# Patient Record
Sex: MALE | Race: BLACK OR AFRICAN AMERICAN | Employment: FULL TIME | URBAN - METROPOLITAN AREA
[De-identification: names, ages, dates, MRNs, and addresses within clinical notes are randomized per-mention and may not be internally consistent; named-entity substitution may affect disease eponyms.]

---

## 2022-05-05 ENCOUNTER — APPOINTMENT (OUTPATIENT)
Dept: GENERAL RADIOLOGY | Age: 67
DRG: 871 | End: 2022-05-05
Attending: EMERGENCY MEDICINE

## 2022-05-05 ENCOUNTER — HOSPITAL ENCOUNTER (INPATIENT)
Age: 67
LOS: 2 days | Discharge: LEFT BEFORE DISCHARGE | DRG: 871 | End: 2022-05-07
Attending: EMERGENCY MEDICINE | Admitting: FAMILY MEDICINE

## 2022-05-05 ENCOUNTER — APPOINTMENT (OUTPATIENT)
Dept: CT IMAGING | Age: 67
DRG: 871 | End: 2022-05-05
Attending: EMERGENCY MEDICINE

## 2022-05-05 DIAGNOSIS — K57.92 ACUTE DIVERTICULITIS: Primary | ICD-10-CM

## 2022-05-05 DIAGNOSIS — K57.92 DIVERTICULITIS: ICD-10-CM

## 2022-05-05 DIAGNOSIS — J18.9 PNEUMONIA OF BOTH LUNGS DUE TO INFECTIOUS ORGANISM, UNSPECIFIED PART OF LUNG: ICD-10-CM

## 2022-05-05 PROBLEM — A41.9 SEVERE SEPSIS (HCC): Status: ACTIVE | Noted: 2022-05-05

## 2022-05-05 PROBLEM — K63.1 PERFORATED BOWEL (HCC): Status: ACTIVE | Noted: 2022-05-05

## 2022-05-05 PROBLEM — R65.20 SEVERE SEPSIS (HCC): Status: ACTIVE | Noted: 2022-05-05

## 2022-05-05 LAB
ALBUMIN SERPL-MCNC: 3.5 G/DL (ref 3.2–4.6)
ALBUMIN/GLOB SERPL: 0.7 {RATIO} (ref 1.2–3.5)
ALP SERPL-CCNC: 73 U/L (ref 50–136)
ALT SERPL-CCNC: 28 U/L (ref 12–65)
ANION GAP SERPL CALC-SCNC: 6 MMOL/L (ref 7–16)
APPEARANCE UR: ABNORMAL
AST SERPL-CCNC: 26 U/L (ref 15–37)
ATRIAL RATE: 90 BPM
BACTERIA URNS QL MICRO: ABNORMAL /HPF
BASOPHILS # BLD: 0 K/UL (ref 0–0.2)
BASOPHILS NFR BLD: 0 % (ref 0–2)
BILIRUB SERPL-MCNC: 1.3 MG/DL (ref 0.2–1.1)
BILIRUB UR QL: ABNORMAL
BUN SERPL-MCNC: 11 MG/DL (ref 8–23)
CALCIUM SERPL-MCNC: 9.7 MG/DL (ref 8.3–10.4)
CALCULATED P AXIS, ECG09: 56 DEGREES
CALCULATED R AXIS, ECG10: -75 DEGREES
CALCULATED T AXIS, ECG11: 50 DEGREES
CASTS URNS QL MICRO: 0 /LPF
CHLORIDE SERPL-SCNC: 103 MMOL/L (ref 98–107)
CO2 SERPL-SCNC: 26 MMOL/L (ref 21–32)
COLOR UR: YELLOW
COVID-19 RAPID TEST, COVR: NOT DETECTED
CREAT SERPL-MCNC: 1.2 MG/DL (ref 0.8–1.5)
CRYSTALS URNS QL MICRO: 0 /LPF
DIAGNOSIS, 93000: NORMAL
DIFFERENTIAL METHOD BLD: ABNORMAL
EOSINOPHIL # BLD: 0 K/UL (ref 0–0.8)
EOSINOPHIL NFR BLD: 0 % (ref 0.5–7.8)
EPI CELLS #/AREA URNS HPF: 0 /HPF
ERYTHROCYTE [DISTWIDTH] IN BLOOD BY AUTOMATED COUNT: 15.3 % (ref 11.9–14.6)
GLOBULIN SER CALC-MCNC: 4.9 G/DL (ref 2.3–3.5)
GLUCOSE SERPL-MCNC: 123 MG/DL (ref 65–100)
GLUCOSE UR STRIP.AUTO-MCNC: NEGATIVE MG/DL
HCT VFR BLD AUTO: 45.6 % (ref 41.1–50.3)
HGB BLD-MCNC: 15.5 G/DL (ref 13.6–17.2)
HGB UR QL STRIP: ABNORMAL
IMM GRANULOCYTES # BLD AUTO: 0.1 K/UL (ref 0–0.5)
IMM GRANULOCYTES NFR BLD AUTO: 1 % (ref 0–5)
KETONES UR QL STRIP.AUTO: NEGATIVE MG/DL
LACTATE SERPL-SCNC: 0.9 MMOL/L (ref 0.4–2)
LEUKOCYTE ESTERASE UR QL STRIP.AUTO: NEGATIVE
LYMPHOCYTES # BLD: 1.4 K/UL (ref 0.5–4.6)
LYMPHOCYTES NFR BLD: 9 % (ref 13–44)
MCH RBC QN AUTO: 29.5 PG (ref 26.1–32.9)
MCHC RBC AUTO-ENTMCNC: 34 G/DL (ref 31.4–35)
MCV RBC AUTO: 86.7 FL (ref 79.6–97.8)
MONOCYTES # BLD: 0.7 K/UL (ref 0.1–1.3)
MONOCYTES NFR BLD: 4 % (ref 4–12)
MUCOUS THREADS URNS QL MICRO: 0 /LPF
NEUTS SEG # BLD: 14.5 K/UL (ref 1.7–8.2)
NEUTS SEG NFR BLD: 86 % (ref 43–78)
NITRITE UR QL STRIP.AUTO: NEGATIVE
NRBC # BLD: 0 K/UL (ref 0–0.2)
OTHER OBSERVATIONS,UCOM: ABNORMAL
P-R INTERVAL, ECG05: 194 MS
PH UR STRIP: 6 [PH] (ref 5–9)
PLATELET # BLD AUTO: 215 K/UL (ref 150–450)
PMV BLD AUTO: 10.7 FL (ref 9.4–12.3)
POTASSIUM SERPL-SCNC: 3.6 MMOL/L (ref 3.5–5.1)
PROCALCITONIN SERPL-MCNC: 0.82 NG/ML (ref 0–0.49)
PROT SERPL-MCNC: 8.4 G/DL (ref 6.3–8.2)
PROT UR STRIP-MCNC: 30 MG/DL
Q-T INTERVAL, ECG07: 378 MS
QRS DURATION, ECG06: 136 MS
QTC CALCULATION (BEZET), ECG08: 462 MS
RBC # BLD AUTO: 5.26 M/UL (ref 4.23–5.6)
RBC #/AREA URNS HPF: ABNORMAL /HPF
SODIUM SERPL-SCNC: 135 MMOL/L (ref 138–145)
SOURCE, COVRS: NORMAL
SP GR UR REFRACTOMETRY: >1.03 (ref 1–1.02)
UROBILINOGEN UR QL STRIP.AUTO: 4 EU/DL (ref 0.2–1)
VENTRICULAR RATE, ECG03: 90 BPM
WBC # BLD AUTO: 16.8 K/UL (ref 4.3–11.1)
WBC URNS QL MICRO: ABNORMAL /HPF

## 2022-05-05 PROCEDURE — 84145 PROCALCITONIN (PCT): CPT

## 2022-05-05 PROCEDURE — 87040 BLOOD CULTURE FOR BACTERIA: CPT

## 2022-05-05 PROCEDURE — 74011000258 HC RX REV CODE- 258: Performed by: FAMILY MEDICINE

## 2022-05-05 PROCEDURE — 96361 HYDRATE IV INFUSION ADD-ON: CPT

## 2022-05-05 PROCEDURE — 36415 COLL VENOUS BLD VENIPUNCTURE: CPT

## 2022-05-05 PROCEDURE — 74011250636 HC RX REV CODE- 250/636: Performed by: EMERGENCY MEDICINE

## 2022-05-05 PROCEDURE — 74011000636 HC RX REV CODE- 636: Performed by: EMERGENCY MEDICINE

## 2022-05-05 PROCEDURE — 65270000029 HC RM PRIVATE

## 2022-05-05 PROCEDURE — 83605 ASSAY OF LACTIC ACID: CPT

## 2022-05-05 PROCEDURE — 71045 X-RAY EXAM CHEST 1 VIEW: CPT

## 2022-05-05 PROCEDURE — 87635 SARS-COV-2 COVID-19 AMP PRB: CPT

## 2022-05-05 PROCEDURE — 74011250636 HC RX REV CODE- 250/636: Performed by: FAMILY MEDICINE

## 2022-05-05 PROCEDURE — 80053 COMPREHEN METABOLIC PANEL: CPT

## 2022-05-05 PROCEDURE — 85025 COMPLETE CBC W/AUTO DIFF WBC: CPT

## 2022-05-05 PROCEDURE — 99285 EMERGENCY DEPT VISIT HI MDM: CPT

## 2022-05-05 PROCEDURE — 74011000250 HC RX REV CODE- 250: Performed by: FAMILY MEDICINE

## 2022-05-05 PROCEDURE — 96375 TX/PRO/DX INJ NEW DRUG ADDON: CPT

## 2022-05-05 PROCEDURE — 96365 THER/PROPH/DIAG IV INF INIT: CPT

## 2022-05-05 PROCEDURE — 74177 CT ABD & PELVIS W/CONTRAST: CPT

## 2022-05-05 PROCEDURE — 74011250637 HC RX REV CODE- 250/637: Performed by: EMERGENCY MEDICINE

## 2022-05-05 PROCEDURE — 99223 1ST HOSP IP/OBS HIGH 75: CPT | Performed by: SURGERY

## 2022-05-05 PROCEDURE — 81015 MICROSCOPIC EXAM OF URINE: CPT

## 2022-05-05 PROCEDURE — 81003 URINALYSIS AUTO W/O SCOPE: CPT

## 2022-05-05 PROCEDURE — 74011000250 HC RX REV CODE- 250: Performed by: EMERGENCY MEDICINE

## 2022-05-05 PROCEDURE — 94762 N-INVAS EAR/PLS OXIMTRY CONT: CPT

## 2022-05-05 PROCEDURE — 87086 URINE CULTURE/COLONY COUNT: CPT

## 2022-05-05 PROCEDURE — 74011000258 HC RX REV CODE- 258: Performed by: EMERGENCY MEDICINE

## 2022-05-05 PROCEDURE — 74011250637 HC RX REV CODE- 250/637: Performed by: FAMILY MEDICINE

## 2022-05-05 PROCEDURE — 93005 ELECTROCARDIOGRAM TRACING: CPT | Performed by: EMERGENCY MEDICINE

## 2022-05-05 RX ORDER — SODIUM CHLORIDE 0.9 % (FLUSH) 0.9 %
5-10 SYRINGE (ML) INJECTION EVERY 8 HOURS
Status: DISCONTINUED | OUTPATIENT
Start: 2022-05-05 | End: 2022-05-07 | Stop reason: HOSPADM

## 2022-05-05 RX ORDER — ACETAMINOPHEN 325 MG/1
650 TABLET ORAL
Status: DISCONTINUED | OUTPATIENT
Start: 2022-05-05 | End: 2022-05-07 | Stop reason: HOSPADM

## 2022-05-05 RX ORDER — SODIUM CHLORIDE 0.9 % (FLUSH) 0.9 %
5-40 SYRINGE (ML) INJECTION AS NEEDED
Status: DISCONTINUED | OUTPATIENT
Start: 2022-05-05 | End: 2022-05-07 | Stop reason: HOSPADM

## 2022-05-05 RX ORDER — VANCOMYCIN/0.9 % SOD CHLORIDE 1.5G/250ML
1500 PLASTIC BAG, INJECTION (ML) INTRAVENOUS EVERY 24 HOURS
Status: DISCONTINUED | OUTPATIENT
Start: 2022-05-06 | End: 2022-05-07 | Stop reason: HOSPADM

## 2022-05-05 RX ORDER — ACETAMINOPHEN 500 MG
1000 TABLET ORAL
Status: COMPLETED | OUTPATIENT
Start: 2022-05-05 | End: 2022-05-05

## 2022-05-05 RX ORDER — SODIUM CHLORIDE 0.9 % (FLUSH) 0.9 %
5-40 SYRINGE (ML) INJECTION EVERY 8 HOURS
Status: DISCONTINUED | OUTPATIENT
Start: 2022-05-05 | End: 2022-05-07 | Stop reason: HOSPADM

## 2022-05-05 RX ORDER — DEXTROSE MONOHYDRATE, SODIUM CHLORIDE, SODIUM LACTATE, POTASSIUM CHLORIDE, CALCIUM CHLORIDE 5; 600; 310; 179; 20 G/100ML; MG/100ML; MG/100ML; MG/100ML; MG/100ML
INJECTION, SOLUTION INTRAVENOUS CONTINUOUS
Status: DISCONTINUED | OUTPATIENT
Start: 2022-05-05 | End: 2022-05-07 | Stop reason: HOSPADM

## 2022-05-05 RX ORDER — SODIUM CHLORIDE 0.9 % (FLUSH) 0.9 %
5-10 SYRINGE (ML) INJECTION AS NEEDED
Status: DISCONTINUED | OUTPATIENT
Start: 2022-05-05 | End: 2022-05-07 | Stop reason: HOSPADM

## 2022-05-05 RX ORDER — ONDANSETRON 2 MG/ML
4 INJECTION INTRAMUSCULAR; INTRAVENOUS
Status: DISCONTINUED | OUTPATIENT
Start: 2022-05-05 | End: 2022-05-07 | Stop reason: HOSPADM

## 2022-05-05 RX ORDER — ONDANSETRON 4 MG/1
4 TABLET, ORALLY DISINTEGRATING ORAL
Status: DISCONTINUED | OUTPATIENT
Start: 2022-05-05 | End: 2022-05-07 | Stop reason: HOSPADM

## 2022-05-05 RX ORDER — MORPHINE SULFATE 2 MG/ML
2 INJECTION, SOLUTION INTRAMUSCULAR; INTRAVENOUS
Status: COMPLETED | OUTPATIENT
Start: 2022-05-05 | End: 2022-05-05

## 2022-05-05 RX ORDER — ACETAMINOPHEN 650 MG/1
650 SUPPOSITORY RECTAL
Status: DISCONTINUED | OUTPATIENT
Start: 2022-05-05 | End: 2022-05-07 | Stop reason: HOSPADM

## 2022-05-05 RX ORDER — VANCOMYCIN 2 GRAM/500 ML IN 0.9 % SODIUM CHLORIDE INTRAVENOUS
2000 ONCE
Status: COMPLETED | OUTPATIENT
Start: 2022-05-05 | End: 2022-05-05

## 2022-05-05 RX ORDER — IPRATROPIUM BROMIDE AND ALBUTEROL SULFATE 2.5; .5 MG/3ML; MG/3ML
3 SOLUTION RESPIRATORY (INHALATION)
Status: DISCONTINUED | OUTPATIENT
Start: 2022-05-05 | End: 2022-05-07 | Stop reason: HOSPADM

## 2022-05-05 RX ORDER — ONDANSETRON 2 MG/ML
4 INJECTION INTRAMUSCULAR; INTRAVENOUS
Status: COMPLETED | OUTPATIENT
Start: 2022-05-05 | End: 2022-05-05

## 2022-05-05 RX ORDER — IBUPROFEN 200 MG
1 TABLET ORAL DAILY
Status: DISCONTINUED | OUTPATIENT
Start: 2022-05-06 | End: 2022-05-07 | Stop reason: HOSPADM

## 2022-05-05 RX ORDER — HYDROMORPHONE HYDROCHLORIDE 1 MG/ML
1 INJECTION, SOLUTION INTRAMUSCULAR; INTRAVENOUS; SUBCUTANEOUS
Status: DISCONTINUED | OUTPATIENT
Start: 2022-05-05 | End: 2022-05-07 | Stop reason: HOSPADM

## 2022-05-05 RX ORDER — SODIUM CHLORIDE 0.9 % (FLUSH) 0.9 %
10 SYRINGE (ML) INJECTION
Status: COMPLETED | OUTPATIENT
Start: 2022-05-05 | End: 2022-05-05

## 2022-05-05 RX ADMIN — PIPERACILLIN AND TAZOBACTAM 4.5 G: 4; .5 INJECTION, POWDER, LYOPHILIZED, FOR SOLUTION INTRAVENOUS at 12:21

## 2022-05-05 RX ADMIN — MORPHINE SULFATE 2 MG: 2 INJECTION, SOLUTION INTRAMUSCULAR; INTRAVENOUS at 12:21

## 2022-05-05 RX ADMIN — SODIUM CHLORIDE 100 ML: 9 INJECTION, SOLUTION INTRAVENOUS at 12:12

## 2022-05-05 RX ADMIN — ACETAMINOPHEN 650 MG: 325 TABLET ORAL at 19:35

## 2022-05-05 RX ADMIN — ACETAMINOPHEN 1000 MG: 500 TABLET ORAL at 12:21

## 2022-05-05 RX ADMIN — SODIUM CHLORIDE, PRESERVATIVE FREE 10 ML: 5 INJECTION INTRAVENOUS at 22:35

## 2022-05-05 RX ADMIN — SODIUM CHLORIDE 1000 ML: 9 INJECTION, SOLUTION INTRAVENOUS at 17:49

## 2022-05-05 RX ADMIN — POTASSIUM CHLORIDE, SODIUM CHLORIDE, CALCIUM CHLORIDE, SODIUM LACTATE, AND DEXTROSE MONOHYDRATE: 1.79; 6; .2; 3.1; 5 INJECTION, SOLUTION INTRAVENOUS at 14:59

## 2022-05-05 RX ADMIN — SODIUM CHLORIDE, POTASSIUM CHLORIDE, SODIUM LACTATE AND CALCIUM CHLORIDE 1000 ML: 600; 310; 30; 20 INJECTION, SOLUTION INTRAVENOUS at 10:53

## 2022-05-05 RX ADMIN — SODIUM CHLORIDE 1000 ML: 9 INJECTION, SOLUTION INTRAVENOUS at 14:59

## 2022-05-05 RX ADMIN — ONDANSETRON 4 MG: 2 INJECTION INTRAMUSCULAR; INTRAVENOUS at 12:21

## 2022-05-05 RX ADMIN — SODIUM CHLORIDE, PRESERVATIVE FREE 10 ML: 5 INJECTION INTRAVENOUS at 17:11

## 2022-05-05 RX ADMIN — VANCOMYCIN HYDROCHLORIDE 2000 MG: 10 INJECTION, POWDER, LYOPHILIZED, FOR SOLUTION INTRAVENOUS at 14:59

## 2022-05-05 RX ADMIN — SODIUM CHLORIDE, PRESERVATIVE FREE 10 ML: 5 INJECTION INTRAVENOUS at 22:36

## 2022-05-05 RX ADMIN — PIPERACILLIN AND TAZOBACTAM 3.38 G: 3; .375 INJECTION, POWDER, LYOPHILIZED, FOR SOLUTION INTRAVENOUS at 21:37

## 2022-05-05 RX ADMIN — Medication 10 ML: at 12:13

## 2022-05-05 RX ADMIN — IOPAMIDOL 100 ML: 755 INJECTION, SOLUTION INTRAVENOUS at 12:13

## 2022-05-05 NOTE — CONSULTS
H&P/Consult Note/Progress Note/Office Note:   Perez Dewitt  MRN: [id number]  :1955  Age:66 y.o.    HPI: Perez Dewitt is a 61 y.o. male who presented to the emergency department today with left lower quadrant pain. The patient states that about 2 days ago he began to develop some pain in his left lower quadrant. The pain continued to worsen and progressed over the last few days. He denies any associated nausea or emesis. He states that he is having regular bowel movements. He last had a bowel movement this morning, he states it was regular and soft. He denies any hematochezia. He states that he has never had diverticulitis in the past.    Upon arrival to the emergency department patient had a CT scan performed which revealed findings of acute diverticulitis with no evidence of perforation or abscess. There was some small bowel wall thickening adjacent to diverticulitis likely reactive, there is a possible fistula formed between the small bowel and sigmoid colon. He also had an elevated white blood cell count at 16.8 and a fever of 101.1. The patient has never had a colonoscopy in the past.  He has no significant past surgical history. He has no significant past medical history. The patient is a current smoker and smokes 1 pack/day. No past medical history on file. No past surgical history on file. Current Facility-Administered Medications   Medication Dose Route Frequency    sodium chloride (NS) flush 5-10 mL  5-10 mL IntraVENous Q8H    sodium chloride (NS) flush 5-10 mL  5-10 mL IntraVENous PRN     No current outpatient medications on file. Patient has no known allergies. Social History     Socioeconomic History    Marital status: SINGLE     Social History     Tobacco Use   Smoking Status Not on file   Smokeless Tobacco Not on file     No family history on file. ROS: The patient has no difficulty with chest pain or shortness of breath. No fever or chills.   Comprehensive review of systems was otherwise unremarkable except as noted above. Physical Exam:   Visit Vitals  BP (!) 153/97   Pulse 87   Temp (!) 101.1 °F (38.4 °C)   Resp 27   Ht 5' 6\" (1.676 m)   Wt 200 lb (90.7 kg)   SpO2 94%   BMI 32.28 kg/m²     Vitals:    05/05/22 0935 05/05/22 1051   BP: (!) 144/94 (!) 153/97   Pulse: 94 87   Resp: 22 27   Temp: (!) 101.1 °F (38.4 °C)    SpO2: 95% 94%   Weight: 200 lb (90.7 kg)    Height: 5' 6\" (1.676 m)      No intake/output data recorded. No intake/output data recorded. Constitutional: Alert, oriented, cooperative patient in no acute distress; appears stated age    Eyes:Sclera are clear. EOMs intact  ENMT: no external lesions gross hearing normal; no obvious neck masses, no ear or lip lesions, nares normal  CV: RRR. Normal perfusion  Resp: No JVD. Breathing is  non-labored; no audible wheezing. GI: soft, obese, mildly distended, tender to palpation in the left lower quadrant. No rebound tenderness or guarding. No signs of peritonitis     Musculoskeletal: unremarkable with normal function. No embolic signs or cyanosis. Neuro:  Oriented; moves all 4; no focal deficits  Psychiatric: normal affect and mood, no memory impairment    Recent vitals (if inpt):  Patient Vitals for the past 24 hrs:   BP Temp Pulse Resp SpO2 Height Weight   05/05/22 1051 (!) 153/97  87 27 94 %     05/05/22 0935 (!) 144/94 (!) 101.1 °F (38.4 °C) 94 22 95 % 5' 6\" (1.676 m) 200 lb (90.7 kg)       Amount and/or Complexity of Data Reviewed and Analyzed:  I reviewed and analyzed all of the unique labs and radiologic studies that are shown below as well as any that are in the HPI, and any that are in the expanded problem list below  *Each unique test, order, or document contributes to the combination of 2 or combination of 3 in Category 1 below. For this visit I also reviewed old records and prior notes.       Recent Labs     05/05/22  0952   WBC 16.8*   HGB 15.5      *   K 3.6    CO2 26   BUN 11   CREA 1.20   *   TBILI 1.3*   ALT 28   AP 73     Review of most recent CBC  Lab Results   Component Value Date/Time    WBC 16.8 (H) 05/05/2022 09:52 AM    HGB 15.5 05/05/2022 09:52 AM    HCT 45.6 05/05/2022 09:52 AM    PLATELET 104 18/29/8937 09:52 AM    MCV 86.7 05/05/2022 09:52 AM       Review of most recent BMP  Lab Results   Component Value Date/Time    Sodium 135 (L) 05/05/2022 09:52 AM    Potassium 3.6 05/05/2022 09:52 AM    Chloride 103 05/05/2022 09:52 AM    CO2 26 05/05/2022 09:52 AM    Anion gap 6 (L) 05/05/2022 09:52 AM    Glucose 123 (H) 05/05/2022 09:52 AM    BUN 11 05/05/2022 09:52 AM    Creatinine 1.20 05/05/2022 09:52 AM    GFR est AA >60 05/05/2022 09:52 AM    GFR est non-AA >60 05/05/2022 09:52 AM    Calcium 9.7 05/05/2022 09:52 AM       Review of most recent LFTs (and lipase if done)  Lab Results   Component Value Date/Time    ALT (SGPT) 28 05/05/2022 09:52 AM    AST (SGOT) 26 05/05/2022 09:52 AM    Alk. phosphatase 73 05/05/2022 09:52 AM    Bilirubin, total 1.3 (H) 05/05/2022 09:52 AM     No results found for: LPSE    No results found for: INR, APTT, CBIL, LCAD, NH4, TROPT, TROIQ, INREXT    Review of most recent HgbA1c  No results found for: HBA1C, WJC3HNZC, RFH9RKJM    Nutritional assessment screen for wound healing issues:  Lab Results   Component Value Date/Time    Protein, total 8.4 (H) 05/05/2022 09:52 AM    Albumin 3.5 05/05/2022 09:52 AM       @lastcovr@  XR Results (most recent):  Results from Hospital Encounter encounter on 05/05/22    XR CHEST PORT    Narrative  EXAMINATION: CHEST RADIOGRAPH 5/5/2022 10:01 AM    ACCESSION NUMBER: [id number]    INDICATION: meets SIRS criteria    COMPARISON: None available    TECHNIQUE: A single view of the chest was obtained. FINDINGS:    Support Lines and Tubes: None    Cardiac Silhouette: Within normal limits in size. Lungs: Patchy bibasilar pulmonary opacities. Pleura: No pleural effusion. No pneumothorax.     Osseous Structures: Unremarkable. Upper Abdomen: Unremarkable. Impression  1. Patchy bilateral pulmonary opacities, likely bilateral pneumonia. Correlation  for COVID 19 recommended. 2. Follow-up to resolution is recommended. CT Results (most recent):  Results from Hospital Encounter encounter on 05/05/22    CT ABD PELV W CONT    Narrative  EXAMINATION: CT  ABDOMEN / PELVIS   5/5/2022 12:16 PM    ACCESSION NUMBER:  [id number]    INDICATION:  Left lower quadrant abdominal pain, concern for diverticulitis. COMPARISON: Chest x-ray 5/5/2022    TECHNIQUE: Contiguous axial computed tomographic images were obtained from the  domes of the diaphragm to the symphysis pubis after the administration of  intravenous contrast. Coronal reconstructions were also performed. Radiation dose reduction techniques were used for this study:  Our CT scanners  use one or all of the following: Automated exposure control, adjustment of the  mA and/or kVp according to patient's size, iterative reconstruction. FINDINGS:    LIMITED THORAX:The included portions of the heart and pericardium are  unremarkable. Patchy subpleural opacities of the lung bases. PERITONEUM/MESENTERY: No pneumoperitoneum. No lymphadenopathy. GI TRACT: There is diverticulosis, wall thickening, and surrounding inflammatory  stranding at the junction of the sigmoid colon and descending colon. There is no  adjacent well-formed fluid collection. There is a possible forming fistula with  the adjacent small bowel (axial images 66 and 67). The appendix is normal. The terminal ileum is unremarkable. There is multifocal  left lower quadrant small bowel wall thickening adjacent to the above-described  diverticulitis. The bowel loops are mildly dilated, without evidence of  obstruction. Small hiatal hernia. Mucosal hyperenhancement of the duodenum and proximal  jejunum.     SPLEEN: Normal    ADRENALS: Normal    PANCREAS: Normal    LIVER: Normal    GALLBLADDER: Normal    KIDNEYS: Normal    BLADDER/: Unremarkable urinary bladder. The prostate gland and seminal  vesicles are unremarkable. VESSELS: The main portal vein and major tributary branches are patent. Mild  scattered atherosclerosis. BONES/SOFT TISSUES: Pubic symphysis degenerative change. Lumbar spine  spondylosis without suspicious lytic or blastic bony lesions. Impression  1. Acute diverticulitis at the junction of the descending colon and sigmoid  colon. There is no evidence of large volume colonic perforation or well-formed  pericolonic abscess. 2.  Small bowel wall thickening in the pelvis adjacent to the diverticulitis is  most likely reactive. There is mild bowel dilation, without associated  obstruction. There may be a forming fistula between the small bowel and sigmoid  colon, best visualized on axial images 66 and 67.    3.  Mucosal hyperenhancement throughout the duodenum and proximal jejunum. This  is separate from the diverticulitis and may represent a mild enteritis. 4.  Partially visualized patchy opacities at the lung bases. As discussed on the  prior chest x-ray, this may be a pneumonia. 5.  Chronic findings otherwise as detailed above. ASSESSMENT/PLAN:    Active Problems:    * No active hospital problems. *       The patient has sigmoid diverticulitis with likely microperforation, no evidence of any free air or abscess. No need for any surgical intervention at this time. He does has a phlegmon around sigmoid colon with thickened surrounding small bowel loops,   Recommend patient be admitted and started on IV antibiotics. It was discussed with the patient that if his clinical status worsens there is the chance that he may need an emergent laparotomy with resection and diverting colostomy. But hope to avoid emergency surgery, he might need elective colectomy in future. The patient verbalized understanding.  Patient to be admitted to hospitalist        Number and Complexity of Problems addressed and   Risks of complications and/or morbidity of management            Level of MDM (2/3 elements below)  Number and Complexity of Problems Addressed Amount and/or Complexity of Data to be Reviewed and Analyzed  *Each unique test, order, or document contributes to the combination of 2 or combination of 3 in Category 1 below.  Risk of Complications and/or Morbidity or Mortality of pt Management     84571  42173 SF Minimal  1self-limited or minor problem Minimal or none Minimal risk of morbidity from additional diagnostic testing or Rx   43523  41208 Low Low  2or more self-limited or minor problems;    or  1stable chronic illness;    or  2TETVM, uncomplicated illness or injury   Limited  (Must meet the requirements of at least 1 of the 2 categories)  Category 1: Tests and documents   Any combination of 2 from the following:  Review of prior external note(s) from each unique source*;  review of the result(s) of each unique test*;   ordering of each unique test*    or   Category 2: Assessment requiring an independent historian(s)  (For the categories of independent interpretation of tests and discussion of management or test interpretation, see moderate or high) Low risk of morbidity from additional diagnostic testing or treatment     01983  29954 Mod Moderate  1or more chronic illnesses with exacerbation, progression, or side effects of treatment;    or  2or more stable chronic illnesses;    or  1undiagnosed new problem with uncertain prognosis;    or  1acute illness with systemic symptoms;    or  1TPAMB complicated injury   Moderate  (Must meet the requirements of at least 1 out of 3 categories)  Category 1: Tests, documents, or independent historian(s)  Any combination of 3 from the following:   Review of prior external note(s) from each unique source*;  Review of the result(s) of each unique test*;  Ordering of each unique test*;  Assessment requiring an independent historian(s)    or  Category 2: Independent interpretation of tests   Independent interpretation of a test performed by another physician/other qualified health care professional (not separately reported);     or  Category 3: Discussion of management or test interpretation  Discussion of management or test interpretation with external physician/other qualified health care professional/appropriate source (not separately reported)   Moderate risk of morbidity from additional diagnostic testing or treatment  Examples only:  Prescription drug management   Decision regarding minor surgery with identified patient or procedure risk factors  Decision regarding elective major surgery without identified patient or procedure risk factors   Diagnosis or treatment significantly limited by social determinants of health       65129  00849 High High  1or more chronic illnesses with severe exacerbation, progression, or side effects of treatment;    or  1 acute or chronic illness or injury that poses a threat to life or bodily function   Extensive  (Must meet the requirements of at least 2 out of 3 categories)  Category 1: Tests, documents, or independent historian(s)  Any combination of 3 from the following:   Review of prior external note(s) from each unique source*;  Review of the result(s) of each unique test*;   Ordering of each unique test*;   Assessment requiring an independent historian(s)    or   Category 2: Independent interpretation of tests   Independent interpretation of a test performed by another physician/other qualified health care professional (not separately reported);     or  Category 3: Discussion of management or test interpretation  Discussion of management or test interpretation with external physician/other qualified health care professional/appropriate source (not separately reported)   High risk of morbidity from additional diagnostic testing or treatment  Examples only:  Drug therapy requiring intensive monitoring for toxicity  Decision regarding elective major surgery with identified patient or procedure risk factors  Decision regarding emergency major surgery  Decision regarding hospitalization  Decision not to resuscitate or to de-escalate care because of poor prognosis             I have personally performed a face-to-face diagnostic evaluation and management  service on this patient. I have independently seen the patient. I have independently obtained the above history from the patient/family. I have independently examined the patient with above findings. I have independently reviewed data/labs for this patient and developed the above plan of care (MDM).   Signed: Joseph Chaves MD, FACS

## 2022-05-05 NOTE — PROGRESS NOTES
Patient asleep in bed. Awakes to voice. Respirations present. On room air. No signs of distress. S1 and S2 noted. Radial and pedal pulses palpable bilaterally. Bowel sounds active. Patient states last BM was today, 5/5/2022. Continuous fluids administering as ordered. Bed low and locked. Call light within reach. Preparing to give bedside report to oncoming RN.

## 2022-05-05 NOTE — PROGRESS NOTES
VANCO DAILY FOLLOW UP NOTE  [address] [facility] Pharmacokinetic Monitoring Service - Vancomycin    Consulting Provider: Dr. Gael Baker   Indication: sepsis, intra-abdominal infection, CAP  Target Concentration: Goal AUC/VINI 400-600 mg*hr/L  Day of Therapy: 1  Additional Antimicrobials: pip/tazo    Pertinent Laboratory Values: Wt Readings from Last 1 Encounters:   05/05/22 90.7 kg (200 lb)     Temp Readings from Last 1 Encounters:   05/05/22 (!) 101.1 °F (38.4 °C)     No components found for: PROCAL  Recent Labs     05/05/22  0952   BUN 11   CREA 1.20   WBC 16.8*   PCT 0.82*   LAC 0.9     Estimated Creatinine Clearance: 65.6 mL/min (based on SCr of 1.2 mg/dL). No results found for: Katherin Angeles    MRSA Nasal Swab: N/A. Non-respiratory infection.     Assessment:  Date/Time Dose Concentration AUC         Note: Serum concentrations collected for AUC dosing may appear elevated if collected in close proximity to the dose administered, this is not necessarily an indication of toxicity    Plan:  Dosing recommendations based on Bayesian software  Start vancomycin 2000 mg x 1 followed by 1500 mg q24h  Anticipated AUC of 454 and trough concentration of 12.9 at steady state  Renal labs as indicated   Vancomycin concentrations will be ordered as clinically appropriate   Pharmacy will continue to monitor patient and adjust therapy as indicated    Thank you for the consult,  MIGUEL Stockton

## 2022-05-05 NOTE — PROGRESS NOTES
TRANSFER - IN REPORT:    Verbal report received from Cece(name) on Paris Kinsey  being received from ER(unit) for routine progression of care      Report consisted of patients Situation, Background, Assessment and   Recommendations(SBAR). Information from the following report(s) SBAR was reviewed with the receiving nurse. Opportunity for questions and clarification was provided.

## 2022-05-05 NOTE — ACP (ADVANCE CARE PLANNING)
[facility] Hospitalist [facility]  At the heart of better care     Advance Care Planning   Admit Date:  2022 10:39 AM   Name:  Amador Aceves   Age:  61 y.o. Sex:  male  :  1955   MRN:  [id number]   Room:  [place] [address]    Amador Aceves is able to make his own decisions: Yes    Patient / surrogate decision-maker directed:  Code Status: FULL CODE -full aggressive medical and surgical interventions, including intubations, resuscitations, pressors, artificial tube feeding    Patient or surrogate consented to discussion of the current conditions, workup, management plans, prognosis, and understand the risk for further deterioration. Time spent: 17 minutes in direct discussion (face to face and/or over phone).     Signed:  Alan Gaines DO

## 2022-05-05 NOTE — ED NOTES
TRANSFER - OUT REPORT:    Verbal report given to Ana Laura RN(name) on Trey Colon  being transferred to [facility](unit) for routine progression of care       Report consisted of patients Situation, Background, Assessment and   Recommendations(SBAR). Information from the following report(s) SBAR was reviewed with the receiving nurse. Lines:   Peripheral IV 05/05/22 Left Antecubital (Active)   Site Assessment Clean, dry, & intact 05/05/22 0953   Phlebitis Assessment 0 05/05/22 0953   Infiltration Assessment 0 05/05/22 0953   Dressing Status Clean, dry, & intact 05/05/22 0953   Dressing Type Transparent;Tape 05/05/22 0953   Hub Color/Line Status Pink 05/05/22 0953   Action Taken Blood drawn 05/05/22 0090        Opportunity for questions and clarification was provided.       Patient transported with:   [facility]

## 2022-05-05 NOTE — ED PROVIDER NOTES
29-year-old gentleman denies to me any chronic medical problems or surgery. Denies take any medications states he started 48 hours ago with some lower abdominal pain, worse on the left. Denies to me any fever chills. No nausea vomiting diarrhea constipation or bleeding. He has had an occasional cough. Slight shortness of breath. No sputum no wheezing. Denies really any chest pain. No abdominal pain like this previously. The history is provided by the patient. Abdominal Pain   This is a new problem. The current episode started 2 days ago. The problem occurs constantly. The problem has been gradually worsening. The pain is located in the LLQ. The quality of the pain is aching and dull. The pain is moderate. Pertinent negatives include no fever, no diarrhea, no hematochezia, no melena, no nausea, no vomiting, no constipation, no dysuria, no frequency, no hematuria, no chest pain and no back pain. Nothing worsens the pain. The pain is relieved by nothing. The patient's surgical history non-contributory. No past medical history on file. No past surgical history on file. No family history on file.     Social History     Socioeconomic History    Marital status: SINGLE     Spouse name: Not on file    Number of children: Not on file    Years of education: Not on file    Highest education level: Not on file   Occupational History    Not on file   Tobacco Use    Smoking status: Not on file    Smokeless tobacco: Not on file   Substance and Sexual Activity    Alcohol use: Not on file    Drug use: Not on file    Sexual activity: Not on file   Other Topics Concern    Not on file   Social History Narrative    Not on file     Social Determinants of Health     Financial Resource Strain:     Difficulty of Paying Living Expenses: Not on file   Food Insecurity:     Worried About Running Out of Food in the Last Year: Not on file    Fabian of Food in the Last Year: Not on file   Transportation Needs:     Lack of Transportation (Medical): Not on file    Lack of Transportation (Non-Medical): Not on file   Physical Activity:     Days of Exercise per Week: Not on file    Minutes of Exercise per Session: Not on file   Stress:     Feeling of Stress : Not on file   Social Connections:     Frequency of Communication with Friends and Family: Not on file    Frequency of Social Gatherings with Friends and Family: Not on file    Attends Sabianism Services: Not on file    Active Member of [address] [place] [address] or Organizations: Not on file    Attends Club or Organization Meetings: Not on file    Marital Status: Not on file   Intimate Partner Violence:     Fear of Current or Ex-Partner: Not on file    Emotionally Abused: Not on file    Physically Abused: Not on file    Sexually Abused: Not on file   Housing Stability:     Unable to Pay for Housing in the Last Year: Not on file    Number of [place] in the Last Year: Not on file    Unstable Housing in the Last Year: Not on file         ALLERGIES: Patient has no known allergies. Review of Systems   Constitutional: Negative for chills and fever. Respiratory: Positive for cough and shortness of breath. Cardiovascular: Negative for chest pain. Gastrointestinal: Positive for abdominal pain. Negative for blood in stool, constipation, diarrhea, hematochezia, melena, nausea and vomiting. Genitourinary: Negative for dysuria, frequency and hematuria. Musculoskeletal: Negative for back pain. All other systems reviewed and are negative. Vitals:    05/05/22 0935 05/05/22 1051   BP: (!) 144/94 (!) 153/97   Pulse: 94 87   Resp: 22 27   Temp: (!) 101.1 °F (38.4 °C)    SpO2: 95% 94%   Weight: 90.7 kg (200 lb)    Height: 5' 6\" (1.676 m)             Physical Exam  Vitals and nursing note reviewed. Constitutional:       General: He is not in acute distress. Appearance: He is well-developed. HENT:      Head: Normocephalic and atraumatic.       Right Ear: External ear normal.      Left Ear: External ear normal.      Mouth/Throat:      Pharynx: No oropharyngeal exudate. Eyes:      Conjunctiva/sclera: Conjunctivae normal.      Pupils: Pupils are equal, round, and reactive to light. Cardiovascular:      Rate and Rhythm: Normal rate and regular rhythm. Heart sounds: No murmur heard. Pulmonary:      Effort: No respiratory distress. Breath sounds: Normal breath sounds. Abdominal:      General: Bowel sounds are normal.      Palpations: Abdomen is soft. There is no mass. Tenderness: There is abdominal tenderness in the left lower quadrant. There is no guarding or rebound. Hernia: No hernia is present. Comments: Moderate tenderness at least on palpation left lower quadrant   Musculoskeletal:      Cervical back: Normal range of motion and neck supple. Skin:     General: Skin is warm and dry. Neurological:      Mental Status: He is alert and oriented to person, place, and time. Gait: Gait normal.      Comments: Nl speech   Psychiatric:         Speech: Speech normal.          MDM  Number of Diagnoses or Management Options  Diagnosis management comments: URI symptoms and a febrile patient who also has abdominal pain. Screen for COVID, pneumonia. Screen for sepsis. Abdominal pain concerning. Get CT scan to check for diverticulitis or perforated viscus. Likely would be appendicitis cholecystitis or diverticulitis. Check urinary tract infection.        Amount and/or Complexity of Data Reviewed  Clinical lab tests: ordered and reviewed  Tests in the radiology section of CPT®: ordered and reviewed  Tests in the medicine section of CPT®: ordered and reviewed    Risk of Complications, Morbidity, and/or Mortality  Presenting problems: moderate  Diagnostic procedures: low  Management options: low    Patient Progress  Patient progress: stable         Procedures      Results Include:    Recent Results (from the past 24 hour(s))   EKG, 12 LEAD, INITIAL    Collection Time: 05/05/22  9:39 AM   Result Value Ref Range    Ventricular Rate 90 BPM    Atrial Rate 90 BPM    P-R Interval 194 ms    QRS Duration 136 ms    Q-T Interval 378 ms    QTC Calculation (Bezet) 462 ms    Calculated P Axis 56 degrees    Calculated R Axis -75 degrees    Calculated T Axis 50 degrees    Diagnosis       Normal sinus rhythm  Right bundle branch block  Left anterior fascicular block  !!! Bifascicular block !!!  Minimal voltage criteria for LVH, may be normal variant ( R in aVL )  Septal infarct , age undetermined  Abnormal ECG  No previous ECGs available     LACTIC ACID    Collection Time: 05/05/22  9:52 AM   Result Value Ref Range    Lactic acid 0.9 0.4 - 2.0 MMOL/L   CBC WITH AUTOMATED DIFF    Collection Time: 05/05/22  9:52 AM   Result Value Ref Range    WBC 16.8 (H) 4.3 - 11.1 K/uL    RBC 5.26 4.23 - 5.6 M/uL    HGB 15.5 13.6 - 17.2 g/dL    HCT 45.6 41.1 - 50.3 %    MCV 86.7 79.6 - 97.8 FL    MCH 29.5 26.1 - 32.9 PG    MCHC 34.0 31.4 - 35.0 g/dL    RDW 15.3 (H) 11.9 - 14.6 %    PLATELET 935 502 - 119 K/uL    MPV 10.7 9.4 - 12.3 FL    ABSOLUTE NRBC 0.00 0.0 - 0.2 K/uL    DF AUTOMATED      NEUTROPHILS 86 (H) 43 - 78 %    LYMPHOCYTES 9 (L) 13 - 44 %    MONOCYTES 4 4.0 - 12.0 %    EOSINOPHILS 0 (L) 0.5 - 7.8 %    BASOPHILS 0 0.0 - 2.0 %    IMMATURE GRANULOCYTES 1 0.0 - 5.0 %    ABS. NEUTROPHILS 14.5 (H) 1.7 - 8.2 K/UL    ABS. LYMPHOCYTES 1.4 0.5 - 4.6 K/UL    ABS. MONOCYTES 0.7 0.1 - 1.3 K/UL    ABS. EOSINOPHILS 0.0 0.0 - 0.8 K/UL    ABS. BASOPHILS 0.0 0.0 - 0.2 K/UL    ABS. IMM.  GRANS. 0.1 0.0 - 0.5 K/UL   METABOLIC PANEL, COMPREHENSIVE    Collection Time: 05/05/22  9:52 AM   Result Value Ref Range    Sodium 135 (L) 138 - 145 mmol/L    Potassium 3.6 3.5 - 5.1 mmol/L    Chloride 103 98 - 107 mmol/L    CO2 26 21 - 32 mmol/L    Anion gap 6 (L) 7 - 16 mmol/L    Glucose 123 (H) 65 - 100 mg/dL    BUN 11 8 - 23 MG/DL    Creatinine 1.20 0.8 - 1.5 MG/DL    GFR est AA >60 >60 ml/min/1.73m2    GFR est non-AA >60 >60 ml/min/1.73m2    Calcium 9.7 8.3 - 10.4 MG/DL    Bilirubin, total 1.3 (H) 0.2 - 1.1 MG/DL    ALT (SGPT) 28 12 - 65 U/L    AST (SGOT) 26 15 - 37 U/L    Alk. phosphatase 73 50 - 136 U/L    Protein, total 8.4 (H) 6.3 - 8.2 g/dL    Albumin 3.5 3.2 - 4.6 g/dL    Globulin 4.9 (H) 2.3 - 3.5 g/dL    A-G Ratio 0.7 (L) 1.2 - 3.5     PROCALCITONIN    Collection Time: 05/05/22  9:52 AM   Result Value Ref Range    Procalcitonin 0.82 (H) 0.00 - 0.49 ng/mL   COVID-19 RAPID TEST    Collection Time: 05/05/22 11:12 AM   Result Value Ref Range    Specimen source NASAL      COVID-19 rapid test Not detected NOTD     URINALYSIS W/ RFLX MICROSCOPIC    Collection Time: 05/05/22 12:25 PM   Result Value Ref Range    Color YELLOW      Appearance CLOUDY      Specific gravity >1.030 (H) 1.001 - 1.023    pH (UA) 6.0 5.0 - 9.0      Protein 30 (A) NEG mg/dL    Glucose Negative NEG mg/dL    Ketone Negative NEG mg/dL    Bilirubin SMALL (A) NEG      Blood SMALL (A) NEG      Urobilinogen 4.0 (H) 0.2 - 1.0 EU/dL    Nitrites Negative NEG      Leukocyte Esterase Negative NEG     URINE MICROSCOPIC    Collection Time: 05/05/22 12:25 PM   Result Value Ref Range    WBC 3-5 0 /hpf    RBC 0-3 0 /hpf    Epithelial cells 0 0 /hpf    Bacteria 1+ (H) 0 /hpf    Casts 0 0 /lpf    Crystals, urine 0 0 /LPF    Mucus 0 0 /lpf    Other observations RESULTS VERIFIED MANUALLY       CT ABD PELV W CONT    Result Date: 5/5/2022  EXAMINATION: CT  ABDOMEN / PELVIS   5/5/2022 12:16 PM ACCESSION NUMBER:  [id number] INDICATION:  Left lower quadrant abdominal pain, concern for diverticulitis. COMPARISON: Chest x-ray 5/5/2022 TECHNIQUE: Contiguous axial computed tomographic images were obtained from the domes of the diaphragm to the symphysis pubis after the administration of intravenous contrast. Coronal reconstructions were also performed.  Radiation dose reduction techniques were used for this study:  Our CT scanners use one or all of the following: Automated exposure control, adjustment of the mA and/or kVp according to patient's size, iterative reconstruction. FINDINGS: LIMITED THORAX:The included portions of the heart and pericardium are unremarkable. Patchy subpleural opacities of the lung bases. PERITONEUM/MESENTERY: No pneumoperitoneum. No lymphadenopathy. GI TRACT: There is diverticulosis, wall thickening, and surrounding inflammatory stranding at the junction of the sigmoid colon and descending colon. There is no adjacent well-formed fluid collection. There is a possible forming fistula with the adjacent small bowel (axial images 66 and 67). The appendix is normal. The terminal ileum is unremarkable. There is multifocal left lower quadrant small bowel wall thickening adjacent to the above-described diverticulitis. The bowel loops are mildly dilated, without evidence of obstruction. Small hiatal hernia. Mucosal hyperenhancement of the duodenum and proximal jejunum. SPLEEN: Normal ADRENALS: Normal PANCREAS: Normal LIVER: Normal GALLBLADDER: Normal KIDNEYS: Normal BLADDER/: Unremarkable urinary bladder. The prostate gland and seminal vesicles are unremarkable. VESSELS: The main portal vein and major tributary branches are patent. Mild scattered atherosclerosis. BONES/SOFT TISSUES: Pubic symphysis degenerative change. Lumbar spine spondylosis without suspicious lytic or blastic bony lesions. 1.  Acute diverticulitis at the junction of the descending colon and sigmoid colon. There is no evidence of large volume colonic perforation or well-formed pericolonic abscess. 2.  Small bowel wall thickening in the pelvis adjacent to the diverticulitis is most likely reactive. There is mild bowel dilation, without associated obstruction. There may be a forming fistula between the small bowel and sigmoid colon, best visualized on axial images 66 and 67. 3.  Mucosal hyperenhancement throughout the duodenum and proximal jejunum.  This is separate from the diverticulitis and may represent a mild enteritis. 4.  Partially visualized patchy opacities at the lung bases. As discussed on the prior chest x-ray, this may be a pneumonia. 5.  Chronic findings otherwise as detailed above. XR CHEST PORT    Result Date: 5/5/2022  EXAMINATION: CHEST RADIOGRAPH 5/5/2022 10:01 AM ACCESSION NUMBER: [id number] INDICATION: meets SIRS criteria COMPARISON: None available TECHNIQUE: A single view of the chest was obtained. FINDINGS: Support Lines and Tubes: None Cardiac Silhouette: Within normal limits in size. Lungs: Patchy bibasilar pulmonary opacities. Pleura: No pleural effusion. No pneumothorax. Osseous Structures: Unremarkable. Upper Abdomen: Unremarkable. 1. Patchy bilateral pulmonary opacities, likely bilateral pneumonia. Correlation for COVID 19 recommended. 2. Follow-up to resolution is recommended. Discussed above findings with surgery and possibly gastroenterology. Surgery has seen. Suggested admission with IV antibiotics and they have consulted. Notified hospitalist regarding admission.

## 2022-05-05 NOTE — ED TRIAGE NOTES
Pt ambulatory unassisted to triage with mask in place. Pt complains of abdominal pain and cough x2 days.  Pt denies n/v.

## 2022-05-05 NOTE — H&P
Hospitalist Admission History and Physical     NAME:  Eleanor Dela Cruz   Age:  61 y.o.  :   1955   MRN:   [id number]  PCP: None  Consulting MD:  Treatment Team: Attending Provider: Arden DO; Primary Nurse: Peace Ramírez RN    Chief Complaint   Patient presents with    Abdominal Pain         HPI:   Patient is a 61 y.o. male who presented to the ED for cc LLQ pain for the past two days. No noted trauma. Nothing seems to make it better or worse. Hx of tobacco abuse. Denies SOB    Family history reviewed and negative except as noted above. Vitals- Temp 101. 1. RR 27    Labs- WBC 16.8, total bili 1.3, procal 0.82. Rapid COVID negative.      Chest x ray with patch bilateral opacities    CT abdomen pelvis -  Acute diverticulitis at the junction of the descending colon and sigmoid colon  Social History     Social History Narrative    Not on file        Social History     Tobacco Use    Smoking status: Not on file    Smokeless tobacco: Not on file   Substance Use Topics    Alcohol use: Not on file        Social History     Substance and Sexual Activity   Drug Use Not on file         No Known Allergies    Prior to Admission medications    Not on File           Review of Systems    Constitutional: NAD  Eyes:  no change in visual acuity, no photophobia  Ears, nose, mouth, throat, and face: no  Odynphagia, dysphagia, no thrush or exudate, negative for chronic sinus congestion, recurrent headaches  Respiratory: negative for SOB, hemoptysis or cough  Cardiovascular: negative for CP, palpitations, or PND  Gastrointestinal: LLQ pain  Genitourinary: no urgency, frequency, or dysuria, no nocturia  Integument/breast: negative for skin rash or skin lesions  Hematologic/lymphatic: negative for known bleeding disorder  Musculoskeletal:negative for joint pain or joint tenderness  Neurological: negative for lightheadedness, syncope or presyncopal events, no seizure or CVA history  Behavioral/Psych: negative for depression or chronic anxiety,   Endocrine: negative for polydyspia, polyuria or intolerance to heat or cold  Allergic/Immunologic: negative for chronic allergic rhinitis, or known connective tissue disorder      Objective:     Patient Vitals for the past 24 hrs:   Temp Pulse Resp BP SpO2   05/05/22 1051  87 27 (!) 153/97 94 %   05/05/22 0935 (!) 101.1 °F (38.4 °C) 94 22 (!) 144/94 95 %        No intake/output data recorded. No intake/output data recorded. Data Review:   Recent Results (from the past 24 hour(s))   EKG, 12 LEAD, INITIAL    Collection Time: 05/05/22  9:39 AM   Result Value Ref Range    Ventricular Rate 90 BPM    Atrial Rate 90 BPM    P-R Interval 194 ms    QRS Duration 136 ms    Q-T Interval 378 ms    QTC Calculation (Bezet) 462 ms    Calculated P Axis 56 degrees    Calculated R Axis -75 degrees    Calculated T Axis 50 degrees    Diagnosis       Normal sinus rhythm  Right bundle branch block  Left anterior fascicular block  !!! Bifascicular block !!!  Minimal voltage criteria for LVH, may be normal variant ( R in aVL )  Septal infarct , age undetermined  Abnormal ECG  No previous ECGs available  Confirmed by [facility]  MD (), Sabrina HAINES ([id number]) on 5/5/2022 1:20:58 PM     LACTIC ACID    Collection Time: 05/05/22  9:52 AM   Result Value Ref Range    Lactic acid 0.9 0.4 - 2.0 MMOL/L   CBC WITH AUTOMATED DIFF    Collection Time: 05/05/22  9:52 AM   Result Value Ref Range    WBC 16.8 (H) 4.3 - 11.1 K/uL    RBC 5.26 4.23 - 5.6 M/uL    HGB 15.5 13.6 - 17.2 g/dL    HCT 45.6 41.1 - 50.3 %    MCV 86.7 79.6 - 97.8 FL    MCH 29.5 26.1 - 32.9 PG    MCHC 34.0 31.4 - 35.0 g/dL    RDW 15.3 (H) 11.9 - 14.6 %    PLATELET 449 326 - 673 K/uL    MPV 10.7 9.4 - 12.3 FL    ABSOLUTE NRBC 0.00 0.0 - 0.2 K/uL    DF AUTOMATED      NEUTROPHILS 86 (H) 43 - 78 %    LYMPHOCYTES 9 (L) 13 - 44 %    MONOCYTES 4 4.0 - 12.0 %    EOSINOPHILS 0 (L) 0.5 - 7.8 %    BASOPHILS 0 0.0 - 2.0 %    IMMATURE GRANULOCYTES 1 0.0 - 5.0 %    ABS. NEUTROPHILS 14.5 (H) 1.7 - 8.2 K/UL    ABS. LYMPHOCYTES 1.4 0.5 - 4.6 K/UL    ABS. MONOCYTES 0.7 0.1 - 1.3 K/UL    ABS. EOSINOPHILS 0.0 0.0 - 0.8 K/UL    ABS. BASOPHILS 0.0 0.0 - 0.2 K/UL    ABS. IMM. GRANS. 0.1 0.0 - 0.5 K/UL   METABOLIC PANEL, COMPREHENSIVE    Collection Time: 05/05/22  9:52 AM   Result Value Ref Range    Sodium 135 (L) 138 - 145 mmol/L    Potassium 3.6 3.5 - 5.1 mmol/L    Chloride 103 98 - 107 mmol/L    CO2 26 21 - 32 mmol/L    Anion gap 6 (L) 7 - 16 mmol/L    Glucose 123 (H) 65 - 100 mg/dL    BUN 11 8 - 23 MG/DL    Creatinine 1.20 0.8 - 1.5 MG/DL    GFR est AA >60 >60 ml/min/1.73m2    GFR est non-AA >60 >60 ml/min/1.73m2    Calcium 9.7 8.3 - 10.4 MG/DL    Bilirubin, total 1.3 (H) 0.2 - 1.1 MG/DL    ALT (SGPT) 28 12 - 65 U/L    AST (SGOT) 26 15 - 37 U/L    Alk.  phosphatase 73 50 - 136 U/L    Protein, total 8.4 (H) 6.3 - 8.2 g/dL    Albumin 3.5 3.2 - 4.6 g/dL    Globulin 4.9 (H) 2.3 - 3.5 g/dL    A-G Ratio 0.7 (L) 1.2 - 3.5     PROCALCITONIN    Collection Time: 05/05/22  9:52 AM   Result Value Ref Range    Procalcitonin 0.82 (H) 0.00 - 0.49 ng/mL   COVID-19 RAPID TEST    Collection Time: 05/05/22 11:12 AM   Result Value Ref Range    Specimen source NASAL      COVID-19 rapid test Not detected NOTD     URINALYSIS W/ RFLX MICROSCOPIC    Collection Time: 05/05/22 12:25 PM   Result Value Ref Range    Color YELLOW      Appearance CLOUDY      Specific gravity >1.030 (H) 1.001 - 1.023    pH (UA) 6.0 5.0 - 9.0      Protein 30 (A) NEG mg/dL    Glucose Negative NEG mg/dL    Ketone Negative NEG mg/dL    Bilirubin SMALL (A) NEG      Blood SMALL (A) NEG      Urobilinogen 4.0 (H) 0.2 - 1.0 EU/dL    Nitrites Negative NEG      Leukocyte Esterase Negative NEG     URINE MICROSCOPIC    Collection Time: 05/05/22 12:25 PM   Result Value Ref Range    WBC 3-5 0 /hpf    RBC 0-3 0 /hpf    Epithelial cells 0 0 /hpf    Bacteria 1+ (H) 0 /hpf    Casts 0 0 /lpf    Crystals, urine 0 0 /LPF    Mucus 0 0 /lpf    Other observations RESULTS VERIFIED MANUALLY         Physical Exam:     General:  Alert, cooperative, no distress, appears stated age. Eyes:  Conjunctivae/corneas clear. Ears:  Normal TMs and external ear canals both ears. Nose: Nares normal.   Mouth/Throat: Lips, mucosa, and tongue normal. Teeth and gums normal.   Neck:  no JVD. Back:   deferred   Lungs:   Clear to auscultation bilaterally but diminished at lower bases. Heart:  Regular rate and rhythm, S1, S2 normal   Abdomen:   Soft, mild tenderness to LLQ, +BS   Extremities: Extremities normal, atraumatic, no cyanosis or edema. Pulses: 2+ and symmetric all extremities. Skin: Skin color, texture, turgor normal. No rashes or lesions   Lymph nodes: Cervical, supraclavicular, and axillary nodes normal.   Neurologic: CNII-XII intact. Normal strength, sensation and reflexes throughout. Assessment and Plan     Principal Problem:    Severe sepsis ([address] (5/5/2022)    Active Problems:    Diverticulitis (5/5/2022)      CAP (community acquired pneumonia) (5/5/2022)      Perforated bowel ([address] (5/5/2022)    Severe sepsis (met by WBC, HR, RR, and elevated LFTS) secondary to sigmoid diverticulitis with microperforation and phelgmon. - General surgery has seen and no surgical intervention at this time. Zosyn. NPO. Gentle fluids    CAP - Sputum culture. Zosyn and vancomycin. IS. Trend LFTs.  Likely elevated from current severe sepsis    Nicotine patch    DVT prophylaxis - SCDs  Signed By: Charmaine Kennedy DO   May 5, 2022

## 2022-05-06 LAB
ALBUMIN SERPL-MCNC: 2.8 G/DL (ref 3.2–4.6)
ALBUMIN/GLOB SERPL: 0.6 {RATIO} (ref 1.2–3.5)
ALP SERPL-CCNC: 74 U/L (ref 50–136)
ALT SERPL-CCNC: 30 U/L (ref 12–65)
ANION GAP SERPL CALC-SCNC: 6 MMOL/L (ref 7–16)
AST SERPL-CCNC: 33 U/L (ref 15–37)
BACTERIA SPEC CULT: NORMAL
BACTERIA SPEC CULT: NORMAL
BASOPHILS # BLD: 0 K/UL (ref 0–0.2)
BASOPHILS NFR BLD: 0 % (ref 0–2)
BILIRUB SERPL-MCNC: 1.4 MG/DL (ref 0.2–1.1)
BUN SERPL-MCNC: 10 MG/DL (ref 8–23)
CALCIUM SERPL-MCNC: 9.1 MG/DL (ref 8.3–10.4)
CHLORIDE SERPL-SCNC: 107 MMOL/L (ref 98–107)
CO2 SERPL-SCNC: 25 MMOL/L (ref 21–32)
CREAT SERPL-MCNC: 1.2 MG/DL (ref 0.8–1.5)
DIFFERENTIAL METHOD BLD: ABNORMAL
EOSINOPHIL # BLD: 0 K/UL (ref 0–0.8)
EOSINOPHIL NFR BLD: 0 % (ref 0.5–7.8)
ERYTHROCYTE [DISTWIDTH] IN BLOOD BY AUTOMATED COUNT: 15.3 % (ref 11.9–14.6)
GLOBULIN SER CALC-MCNC: 4.4 G/DL (ref 2.3–3.5)
GLUCOSE SERPL-MCNC: 118 MG/DL (ref 65–100)
GRAM STN SPEC: NORMAL
HCT VFR BLD AUTO: 40.5 % (ref 41.1–50.3)
HGB BLD-MCNC: 13.6 G/DL (ref 13.6–17.2)
IMM GRANULOCYTES # BLD AUTO: 0.1 K/UL (ref 0–0.5)
IMM GRANULOCYTES NFR BLD AUTO: 0 % (ref 0–5)
LYMPHOCYTES # BLD: 1.5 K/UL (ref 0.5–4.6)
LYMPHOCYTES NFR BLD: 10 % (ref 13–44)
MCH RBC QN AUTO: 29.7 PG (ref 26.1–32.9)
MCHC RBC AUTO-ENTMCNC: 33.6 G/DL (ref 31.4–35)
MCV RBC AUTO: 88.4 FL (ref 79.6–97.8)
MONOCYTES # BLD: 0.6 K/UL (ref 0.1–1.3)
MONOCYTES NFR BLD: 4 % (ref 4–12)
NEUTS SEG # BLD: 12.2 K/UL (ref 1.7–8.2)
NEUTS SEG NFR BLD: 85 % (ref 43–78)
NRBC # BLD: 0 K/UL (ref 0–0.2)
PLATELET # BLD AUTO: 186 K/UL (ref 150–450)
PMV BLD AUTO: 11.1 FL (ref 9.4–12.3)
POTASSIUM SERPL-SCNC: 3.8 MMOL/L (ref 3.5–5.1)
PROT SERPL-MCNC: 7.2 G/DL (ref 6.3–8.2)
RBC # BLD AUTO: 4.58 M/UL (ref 4.23–5.6)
SERVICE CMNT-IMP: NORMAL
SODIUM SERPL-SCNC: 138 MMOL/L (ref 136–145)
WBC # BLD AUTO: 14.3 K/UL (ref 4.3–11.1)

## 2022-05-06 PROCEDURE — 36415 COLL VENOUS BLD VENIPUNCTURE: CPT

## 2022-05-06 PROCEDURE — 74011250637 HC RX REV CODE- 250/637: Performed by: FAMILY MEDICINE

## 2022-05-06 PROCEDURE — 94760 N-INVAS EAR/PLS OXIMETRY 1: CPT

## 2022-05-06 PROCEDURE — 74011250636 HC RX REV CODE- 250/636: Performed by: FAMILY MEDICINE

## 2022-05-06 PROCEDURE — 99232 SBSQ HOSP IP/OBS MODERATE 35: CPT | Performed by: NURSE PRACTITIONER

## 2022-05-06 PROCEDURE — 85025 COMPLETE CBC W/AUTO DIFF WBC: CPT

## 2022-05-06 PROCEDURE — 74011000258 HC RX REV CODE- 258: Performed by: FAMILY MEDICINE

## 2022-05-06 PROCEDURE — 80053 COMPREHEN METABOLIC PANEL: CPT

## 2022-05-06 PROCEDURE — 74011000250 HC RX REV CODE- 250: Performed by: EMERGENCY MEDICINE

## 2022-05-06 PROCEDURE — 74011250637 HC RX REV CODE- 250/637: Performed by: INTERNAL MEDICINE

## 2022-05-06 PROCEDURE — 65270000029 HC RM PRIVATE

## 2022-05-06 PROCEDURE — 74011000250 HC RX REV CODE- 250: Performed by: FAMILY MEDICINE

## 2022-05-06 RX ORDER — HYDRALAZINE HYDROCHLORIDE 25 MG/1
25 TABLET, FILM COATED ORAL 3 TIMES DAILY
Status: DISCONTINUED | OUTPATIENT
Start: 2022-05-06 | End: 2022-05-07 | Stop reason: HOSPADM

## 2022-05-06 RX ORDER — HYDRALAZINE HYDROCHLORIDE 20 MG/ML
10 INJECTION INTRAMUSCULAR; INTRAVENOUS
Status: DISCONTINUED | OUTPATIENT
Start: 2022-05-06 | End: 2022-05-07 | Stop reason: HOSPADM

## 2022-05-06 RX ADMIN — ACETAMINOPHEN 650 MG: 325 TABLET ORAL at 02:55

## 2022-05-06 RX ADMIN — VANCOMYCIN HYDROCHLORIDE 1500 MG: 10 INJECTION, POWDER, LYOPHILIZED, FOR SOLUTION INTRAVENOUS at 16:24

## 2022-05-06 RX ADMIN — SODIUM CHLORIDE, PRESERVATIVE FREE 10 ML: 5 INJECTION INTRAVENOUS at 21:38

## 2022-05-06 RX ADMIN — POTASSIUM CHLORIDE, SODIUM CHLORIDE, CALCIUM CHLORIDE, SODIUM LACTATE, AND DEXTROSE MONOHYDRATE: 1.79; 6; .2; 3.1; 5 INJECTION, SOLUTION INTRAVENOUS at 02:13

## 2022-05-06 RX ADMIN — PIPERACILLIN AND TAZOBACTAM 3.38 G: 3; .375 INJECTION, POWDER, LYOPHILIZED, FOR SOLUTION INTRAVENOUS at 11:59

## 2022-05-06 RX ADMIN — SODIUM CHLORIDE, PRESERVATIVE FREE 10 ML: 5 INJECTION INTRAVENOUS at 06:04

## 2022-05-06 RX ADMIN — HYDRALAZINE HYDROCHLORIDE 25 MG: 25 TABLET, FILM COATED ORAL at 21:38

## 2022-05-06 RX ADMIN — SODIUM CHLORIDE, PRESERVATIVE FREE 10 ML: 5 INJECTION INTRAVENOUS at 13:01

## 2022-05-06 RX ADMIN — HYDRALAZINE HYDROCHLORIDE 25 MG: 25 TABLET, FILM COATED ORAL at 16:24

## 2022-05-06 RX ADMIN — SODIUM CHLORIDE, PRESERVATIVE FREE 10 ML: 5 INJECTION INTRAVENOUS at 21:39

## 2022-05-06 RX ADMIN — PIPERACILLIN AND TAZOBACTAM 3.38 G: 3; .375 INJECTION, POWDER, LYOPHILIZED, FOR SOLUTION INTRAVENOUS at 03:27

## 2022-05-06 RX ADMIN — PIPERACILLIN AND TAZOBACTAM 3.38 G: 3; .375 INJECTION, POWDER, LYOPHILIZED, FOR SOLUTION INTRAVENOUS at 19:42

## 2022-05-06 RX ADMIN — ACETAMINOPHEN 650 MG: 325 TABLET ORAL at 15:16

## 2022-05-06 NOTE — PROGRESS NOTES
MSN, CM:  Spoke with patient this afternoon about discharge planning. Patient lives in a [address] group home with 2 steps for entrance and 10 steps leading to bedrooms. Patient is independent with all ADL's and and requires no equipment for ambulation. Patient denies any home oxygen, HH or rehab in the past.  Patient confirms he does not have a PCP or insurance but has applied for Medicare and Medicaid. Patient works full time at [employer] and part time at [employer]. Patient uses the public bus for any transport needs. Patient has no discharge needs at this time. Case Management will continue to follow for any discharge needs that may arise. Care Management Interventions  PCP Verified by CM: Yes (NO PCP)  Mode of Transport at Discharge:  Other (see comment) (family to transport)  Transition of Care Consult (CM Consult): Group Home ([facility])  Support Systems: Other (Comment) ([facility])  Confirm Follow Up Transport: Other (see comment) (bus)  Freedom of Choice List was Provided with Basic Dialogue that Supports the Patient's Individualized Plan of Care/Goals, Treatment Preferences and Shares the Quality Data Associated with the Providers?: Yes  Discharge Location  Patient Expects to be Discharged to[de-identified] Group home

## 2022-05-06 NOTE — PROGRESS NOTES
Pt is resting quietly in bed. Respirations even and unlabored on RA. No signs of distress noted at this time. Call light within reach. Will continue to monitor. Area M Indication Text: Tumors in this location are included in Area M (cheek, forehead, scalp, neck, jawline and pretibial skin).  Mohs surgery is indicated for tumors in these anatomic locations.

## 2022-05-06 NOTE — PROGRESS NOTES
Hospitalist Progress Note    NAME:  Del Holden   Age:  61 y.o.  :   1955   MRN:   [id number]  PCP: None  Consulting MD:  Treatment Team: Attending Provider: Sumaya Maza MD; Primary Nurse: Alma Asher Primary Nurse: Aurelio Diaz RN; Hospitalist: Sumaya Maza MD    Chief Complaint   Patient presents with    Abdominal Pain       As per prior documentation:\"  HPI:   Patient is a 61 y.o. male who presented to the ED for cc LLQ pain for the past two days. No noted trauma. Nothing seems to make it better or worse. Hx of tobacco abuse. Denies SOB    Family history reviewed and negative except as noted above. Vitals- Temp 101. 1. RR 27    Labs- WBC 16.8, total bili 1.3, procal 0.82. Rapid COVID negative. Chest x ray with patch bilateral opacities    CT abdomen pelvis -  Acute diverticulitis at the junction of the descending colon and sigmoid colon\"    2022  Pt seen and evaluated  Febrile overnight  Abdominal pain is improved  Seen by surgery no plans for surgical intervention at this  Denies nausea vomiting or chills.   Still having some loose stools      Social History     Social History Narrative    Not on file        Social History     Tobacco Use    Smoking status: Not on file    Smokeless tobacco: Not on file   Substance Use Topics    Alcohol use: Not on file        Social History     Substance and Sexual Activity   Drug Use Not on file         No Known Allergies    Prior to Admission medications    Not on File               Objective:     Patient Vitals for the past 24 hrs:   Temp Pulse Resp BP SpO2   22 0726 99.3 °F (37.4 °C) 75 17 136/79 95 %   22 0635 99.5 °F (37.5 °C)       22 0400  87      22 0245 (!) 102.5 °F (39.2 °C) 78 16 (!) 143/77 97 %   22 0000  80      22 2303 99.7 °F (37.6 °C) 79 18 130/81 95 %   22 2000  80      22 1926 (!) 102.6 °F (39.2 °C) 84 20 137/75 100 %   22 1806  81      05/05/22 1639 97.7 °F (36.5 °C) 77 22 (!) 162/99 99 %   05/05/22 1600  77 13 (!) 155/114 92 %   05/05/22 1545  73 23 133/87 95 %   05/05/22 1500 98.7 °F (37.1 °C) 77 23 117/79 94 %   05/05/22 1457  75 21 126/80 91 %   05/05/22 1051  87 27 (!) 153/97 94 %   05/05/22 0935 (!) 101.1 °F (38.4 °C) 94 22 (!) 144/94 95 %        No intake/output data recorded. 05/04 1901 - 05/06 0700  In: -   Out: 250 [Urine:250]    Data Review:   Recent Results (from the past 24 hour(s))   EKG, 12 LEAD, INITIAL    Collection Time: 05/05/22  9:39 AM   Result Value Ref Range    Ventricular Rate 90 BPM    Atrial Rate 90 BPM    P-R Interval 194 ms    QRS Duration 136 ms    Q-T Interval 378 ms    QTC Calculation (Bezet) 462 ms    Calculated P Axis 56 degrees    Calculated R Axis -75 degrees    Calculated T Axis 50 degrees    Diagnosis       Normal sinus rhythm  Right bundle branch block  Left anterior fascicular block  !!! Bifascicular block !!!  Minimal voltage criteria for LVH, may be normal variant ( R in aVL )  Septal infarct , age undetermined  Abnormal ECG  No previous ECGs available  Confirmed by [facility]  MD ()Ofe ([id number]) on 5/5/2022 1:20:58 PM     LACTIC ACID    Collection Time: 05/05/22  9:52 AM   Result Value Ref Range    Lactic acid 0.9 0.4 - 2.0 MMOL/L   CBC WITH AUTOMATED DIFF    Collection Time: 05/05/22  9:52 AM   Result Value Ref Range    WBC 16.8 (H) 4.3 - 11.1 K/uL    RBC 5.26 4.23 - 5.6 M/uL    HGB 15.5 13.6 - 17.2 g/dL    HCT 45.6 41.1 - 50.3 %    MCV 86.7 79.6 - 97.8 FL    MCH 29.5 26.1 - 32.9 PG    MCHC 34.0 31.4 - 35.0 g/dL    RDW 15.3 (H) 11.9 - 14.6 %    PLATELET 636 503 - 557 K/uL    MPV 10.7 9.4 - 12.3 FL    ABSOLUTE NRBC 0.00 0.0 - 0.2 K/uL    DF AUTOMATED      NEUTROPHILS 86 (H) 43 - 78 %    LYMPHOCYTES 9 (L) 13 - 44 %    MONOCYTES 4 4.0 - 12.0 %    EOSINOPHILS 0 (L) 0.5 - 7.8 %    BASOPHILS 0 0.0 - 2.0 %    IMMATURE GRANULOCYTES 1 0.0 - 5.0 %    ABS. NEUTROPHILS 14.5 (H) 1.7 - 8.2 K/UL    ABS. LYMPHOCYTES 1.4 0.5 - 4.6 K/UL    ABS. MONOCYTES 0.7 0.1 - 1.3 K/UL    ABS. EOSINOPHILS 0.0 0.0 - 0.8 K/UL    ABS. BASOPHILS 0.0 0.0 - 0.2 K/UL    ABS. IMM. GRANS. 0.1 0.0 - 0.5 K/UL   METABOLIC PANEL, COMPREHENSIVE    Collection Time: 05/05/22  9:52 AM   Result Value Ref Range    Sodium 135 (L) 138 - 145 mmol/L    Potassium 3.6 3.5 - 5.1 mmol/L    Chloride 103 98 - 107 mmol/L    CO2 26 21 - 32 mmol/L    Anion gap 6 (L) 7 - 16 mmol/L    Glucose 123 (H) 65 - 100 mg/dL    BUN 11 8 - 23 MG/DL    Creatinine 1.20 0.8 - 1.5 MG/DL    GFR est AA >60 >60 ml/min/1.73m2    GFR est non-AA >60 >60 ml/min/1.73m2    Calcium 9.7 8.3 - 10.4 MG/DL    Bilirubin, total 1.3 (H) 0.2 - 1.1 MG/DL    ALT (SGPT) 28 12 - 65 U/L    AST (SGOT) 26 15 - 37 U/L    Alk.  phosphatase 73 50 - 136 U/L    Protein, total 8.4 (H) 6.3 - 8.2 g/dL    Albumin 3.5 3.2 - 4.6 g/dL    Globulin 4.9 (H) 2.3 - 3.5 g/dL    A-G Ratio 0.7 (L) 1.2 - 3.5     PROCALCITONIN    Collection Time: 05/05/22  9:52 AM   Result Value Ref Range    Procalcitonin 0.82 (H) 0.00 - 0.49 ng/mL   COVID-19 RAPID TEST    Collection Time: 05/05/22 11:12 AM   Result Value Ref Range    Specimen source NASAL      COVID-19 rapid test Not detected NOTD     URINALYSIS W/ RFLX MICROSCOPIC    Collection Time: 05/05/22 12:25 PM   Result Value Ref Range    Color YELLOW      Appearance CLOUDY      Specific gravity >1.030 (H) 1.001 - 1.023    pH (UA) 6.0 5.0 - 9.0      Protein 30 (A) NEG mg/dL    Glucose Negative NEG mg/dL    Ketone Negative NEG mg/dL    Bilirubin SMALL (A) NEG      Blood SMALL (A) NEG      Urobilinogen 4.0 (H) 0.2 - 1.0 EU/dL    Nitrites Negative NEG      Leukocyte Esterase Negative NEG     URINE MICROSCOPIC    Collection Time: 05/05/22 12:25 PM   Result Value Ref Range    WBC 3-5 0 /hpf    RBC 0-3 0 /hpf    Epithelial cells 0 0 /hpf    Bacteria 1+ (H) 0 /hpf    Casts 0 0 /lpf    Crystals, urine 0 0 /LPF    Mucus 0 0 /lpf    Other observations RESULTS VERIFIED MANUALLY     METABOLIC PANEL, COMPREHENSIVE    Collection Time: 05/06/22  3:58 AM   Result Value Ref Range    Sodium 138 136 - 145 mmol/L    Potassium 3.8 3.5 - 5.1 mmol/L    Chloride 107 98 - 107 mmol/L    CO2 25 21 - 32 mmol/L    Anion gap 6 (L) 7 - 16 mmol/L    Glucose 118 (H) 65 - 100 mg/dL    BUN 10 8 - 23 MG/DL    Creatinine 1.20 0.8 - 1.5 MG/DL    GFR est AA >60 >60 ml/min/1.73m2    GFR est non-AA >60 >60 ml/min/1.73m2    Calcium 9.1 8.3 - 10.4 MG/DL    Bilirubin, total 1.4 (H) 0.2 - 1.1 MG/DL    ALT (SGPT) 30 12 - 65 U/L    AST (SGOT) 33 15 - 37 U/L    Alk. phosphatase 74 50 - 136 U/L    Protein, total 7.2 6.3 - 8.2 g/dL    Albumin 2.8 (L) 3.2 - 4.6 g/dL    Globulin 4.4 (H) 2.3 - 3.5 g/dL    A-G Ratio 0.6 (L) 1.2 - 3.5     CBC WITH AUTOMATED DIFF    Collection Time: 05/06/22  3:58 AM   Result Value Ref Range    WBC 14.3 (H) 4.3 - 11.1 K/uL    RBC 4.58 4.23 - 5.6 M/uL    HGB 13.6 13.6 - 17.2 g/dL    HCT 40.5 (L) 41.1 - 50.3 %    MCV 88.4 79.6 - 97.8 FL    MCH 29.7 26.1 - 32.9 PG    MCHC 33.6 31.4 - 35.0 g/dL    RDW 15.3 (H) 11.9 - 14.6 %    PLATELET 586 896 - 102 K/uL    MPV 11.1 9.4 - 12.3 FL    ABSOLUTE NRBC 0.00 0.0 - 0.2 K/uL    DF AUTOMATED      NEUTROPHILS 85 (H) 43 - 78 %    LYMPHOCYTES 10 (L) 13 - 44 %    MONOCYTES 4 4.0 - 12.0 %    EOSINOPHILS 0 (L) 0.5 - 7.8 %    BASOPHILS 0 0.0 - 2.0 %    IMMATURE GRANULOCYTES 0 0.0 - 5.0 %    ABS. NEUTROPHILS 12.2 (H) 1.7 - 8.2 K/UL    ABS. LYMPHOCYTES 1.5 0.5 - 4.6 K/UL    ABS. MONOCYTES 0.6 0.1 - 1.3 K/UL    ABS. EOSINOPHILS 0.0 0.0 - 0.8 K/UL    ABS. BASOPHILS 0.0 0.0 - 0.2 K/UL    ABS. IMM. GRANS. 0.1 0.0 - 0.5 K/UL       Physical Exam:     General:  Alert, cooperative, no distress, appears stated age. Eyes:  Conjunctivae/corneas clear. Ears:  Normal TMs and external ear canals both ears. Nose: Nares normal.   Mouth/Throat: Lips, mucosa, and tongue normal. Teeth and gums normal.   Neck:  no JVD.    Back:   deferred   Lungs:   Clear to auscultation bilaterally but diminished at lower bases. Heart:  Regular rate and rhythm, S1, S2 normal   Abdomen:   Soft, mild tenderness to LLQ, +BS   Extremities: Extremities normal, atraumatic, no cyanosis or edema. Pulses: 2+ and symmetric all extremities. Skin: Skin color, texture, turgor normal. No rashes or lesions   Lymph nodes: Cervical, supraclavicular, and axillary nodes normal.   Neurologic: CNII-XII intact. Normal strength, sensation and reflexes throughout. Assessment and Plan     Principal Problem:    Severe sepsis ([address] (5/5/2022)    Active Problems:    Diverticulitis (5/5/2022)      CAP (community acquired pneumonia) (5/5/2022)      Perforated bowel ([address] (5/5/2022)    Severe sepsis (met by WBC, HR, RR, and elevated LFTS) secondary to sigmoid diverticulitis with microperforation and phelgmon. - General surgery has seen and no surgical intervention at this time. Zosyn. NPO. Gentle fluids    5/6/2022  Continue antibiotics  Follow-up cultures    CAP - Sputum culture. Zosyn and vancomycin. IS.     5/6/2022  Continue antibiotics  Follow-up cultures    Continue to trend LFTs. Likely elevated from current severe sepsis    Nicotine patch    DVT prophylaxis - SCDs    Dispo:  To be determined    Signed By: Dakota Hall MD   May 6, 2022

## 2022-05-06 NOTE — PROGRESS NOTES
H&P/Consult Note/Progress Note/Office Note:   Sheryle Pao  MRN: [id number]  :1955  Age:66 y.o.    HPI: Sheryle Pao is a 61 y.o. male who presented to the emergency department today with left lower quadrant pain. The patient states that about 2 days ago he began to develop some pain in his left lower quadrant. The pain continued to worsen and progressed over the last few days. He denies any associated nausea or emesis. He states that he is having regular bowel movements. He last had a bowel movement this morning, he states it was regular and soft. He denies any hematochezia. He states that he has never had diverticulitis in the past.    Upon arrival to the emergency department patient had a CT scan performed which revealed findings of acute diverticulitis with no evidence of perforation or abscess. There was some small bowel wall thickening adjacent to diverticulitis likely reactive, there is a possible fistula formed between the small bowel and sigmoid colon. He also had an elevated white blood cell count at 16.8 and a fever of 101.1. The patient has never had a colonoscopy in the past.  He has no significant past surgical history. He has no significant past medical history. The patient is a current smoker and smokes 1 pack/day. 22: Pt awake in bed. Reports pain has improved and \"is almost gone\". Reports +flatus/-BM. No nausea/ vomiting. Max 24hr temp 102. 6. NAD. WBC 14.3    No past medical history on file. No past surgical history on file.   Current Facility-Administered Medications   Medication Dose Route Frequency    sodium chloride (NS) flush 5-10 mL  5-10 mL IntraVENous Q8H    sodium chloride (NS) flush 5-10 mL  5-10 mL IntraVENous PRN    sodium chloride (NS) flush 5-40 mL  5-40 mL IntraVENous Q8H    sodium chloride (NS) flush 5-40 mL  5-40 mL IntraVENous PRN    acetaminophen (TYLENOL) tablet 650 mg  650 mg Oral Q6H PRN    Or    acetaminophen (TYLENOL) suppository 650 mg  650 mg Rectal Q6H PRN    ondansetron (ZOFRAN ODT) tablet 4 mg  4 mg Oral Q8H PRN    Or    ondansetron (ZOFRAN) injection 4 mg  4 mg IntraVENous Q6H PRN    nicotine (NICODERM CQ) 21 mg/24 hr patch 1 Patch  1 Patch TransDERmal DAILY    HYDROmorphone (DILAUDID) injection 1 mg  1 mg IntraVENous Q4H PRN    sodium chloride (NS) flush 5-10 mL  5-10 mL IntraVENous PRN    piperacillin-tazobactam (ZOSYN) 3.375 g in 0.9% sodium chloride (MBP/ADV) 100 mL MBP  3.375 g IntraVENous Q8H    dextrose 5% - LR with KCl 20 mEq/L infusion   IntraVENous CONTINUOUS    albuterol-ipratropium (DUO-NEB) 2.5 MG-0.5 MG/3 ML  3 mL Nebulization Q6H PRN    vancomycin (VANCOCIN) 1500 mg in  ml infusion  1,500 mg IntraVENous Q24H     Patient has no known allergies. Social History     Socioeconomic History    Marital status: SINGLE     Social History     Tobacco Use   Smoking Status Not on file   Smokeless Tobacco Not on file     No family history on file. ROS: The patient has no difficulty with chest pain or shortness of breath. No fever or chills. Comprehensive review of systems was otherwise unremarkable except as noted above. Physical Exam:   Visit Vitals  BP (!) 158/89   Pulse 77   Temp (!) 100.5 °F (38.1 °C)   Resp 18   Ht 5' 6\" (1.676 m)   Wt 200 lb (90.7 kg)   SpO2 97%   BMI 32.28 kg/m²     Vitals:    05/06/22 0400 05/06/22 0635 05/06/22 0726 05/06/22 1110   BP:   136/79 (!) 158/89   Pulse: 87  75 77   Resp:   17 18   Temp:  99.5 °F (37.5 °C) 99.3 °F (37.4 °C) (!) 100.5 °F (38.1 °C)   SpO2:   95% 97%   Weight:       Height:         05/06 0701 - 05/06 1900  In: -   Out: 400 [Urine:400]  05/04 1901 - 05/06 0700  In: -   Out: 250 [Urine:250]    Constitutional: Alert, oriented, cooperative patient in no acute distress; appears stated age    Eyes: Sclera are clear. EOMs intact  ENMT: no external lesions gross hearing normal; no obvious neck masses, no ear or lip lesions, nares normal  CV: RRR.  Normal perfusion  Resp: No JVD. Breathing is  non-labored; no audible wheezing. GI: soft, obese, more distended, Less ttp LLQ. No rebound tenderness or guarding. No signs of peritonitis     Musculoskeletal: unremarkable with normal function. No embolic signs or cyanosis. Neuro:  Oriented; moves all 4; no focal deficits  Psychiatric: normal affect and mood, no memory impairment    Recent vitals (if inpt):  Patient Vitals for the past 24 hrs:   BP Temp Pulse Resp SpO2   05/06/22 1110 (!) 158/89 (!) 100.5 °F (38.1 °C) 77 18 97 %   05/06/22 0726 136/79 99.3 °F (37.4 °C) 75 17 95 %   05/06/22 0635  99.5 °F (37.5 °C)      05/06/22 0400   87     05/06/22 0245 (!) 143/77 (!) 102.5 °F (39.2 °C) 78 16 97 %   05/06/22 0000   80     05/05/22 2303 130/81 99.7 °F (37.6 °C) 79 18 95 %   05/05/22 2000   80     05/05/22 1926 137/75 (!) 102.6 °F (39.2 °C) 84 20 100 %   05/05/22 1806   81     05/05/22 1639 (!) 162/99 97.7 °F (36.5 °C) 77 22 99 %   05/05/22 1600 (!) 155/114  77 13 92 %   05/05/22 1545 133/87  73 23 95 %   05/05/22 1500 117/79 98.7 °F (37.1 °C) 77 23 94 %   05/05/22 1457 126/80  75 21 91 %       Amount and/or Complexity of Data Reviewed and Analyzed:  I reviewed and analyzed all of the unique labs and radiologic studies that are shown below as well as any that are in the HPI, and any that are in the expanded problem list below  *Each unique test, order, or document contributes to the combination of 2 or combination of 3 in Category 1 below. For this visit I also reviewed old records and prior notes.       Recent Labs     05/06/22  0358   WBC 14.3*   HGB 13.6         K 3.8      CO2 25   BUN 10   CREA 1.20   *   TBILI 1.4*   ALT 30   AP 74     Review of most recent CBC  Lab Results   Component Value Date/Time    WBC 14.3 (H) 05/06/2022 03:58 AM    HGB 13.6 05/06/2022 03:58 AM    HCT 40.5 (L) 05/06/2022 03:58 AM    PLATELET 556 91/32/2219 03:58 AM    MCV 88.4 05/06/2022 03:58 AM Review of most recent BMP  Lab Results   Component Value Date/Time    Sodium 138 05/06/2022 03:58 AM    Potassium 3.8 05/06/2022 03:58 AM    Chloride 107 05/06/2022 03:58 AM    CO2 25 05/06/2022 03:58 AM    Anion gap 6 (L) 05/06/2022 03:58 AM    Glucose 118 (H) 05/06/2022 03:58 AM    BUN 10 05/06/2022 03:58 AM    Creatinine 1.20 05/06/2022 03:58 AM    GFR est AA >60 05/06/2022 03:58 AM    GFR est non-AA >60 05/06/2022 03:58 AM    Calcium 9.1 05/06/2022 03:58 AM       Review of most recent LFTs (and lipase if done)  Lab Results   Component Value Date/Time    ALT (SGPT) 30 05/06/2022 03:58 AM    AST (SGOT) 33 05/06/2022 03:58 AM    Alk. phosphatase 74 05/06/2022 03:58 AM    Bilirubin, total 1.4 (H) 05/06/2022 03:58 AM     No results found for: LPSE    No results found for: INR, APTT, CBIL, LCAD, NH4, TROPT, TROIQ, INREXT, INREXT    Review of most recent HgbA1c  No results found for: HBA1C, MHV7GYBN, VLE0XCBT, HKH2RKLL    Nutritional assessment screen for wound healing issues:  Lab Results   Component Value Date/Time    Protein, total 7.2 05/06/2022 03:58 AM    Albumin 2.8 (L) 05/06/2022 03:58 AM       @lastcovr@  XR Results (most recent):  Results from Hospital Encounter encounter on 05/05/22    XR CHEST PORT    Narrative  EXAMINATION: CHEST RADIOGRAPH 5/5/2022 10:01 AM    ACCESSION NUMBER: [id number]    INDICATION: meets SIRS criteria    COMPARISON: None available    TECHNIQUE: A single view of the chest was obtained. FINDINGS:    Support Lines and Tubes: None    Cardiac Silhouette: Within normal limits in size. Lungs: Patchy bibasilar pulmonary opacities. Pleura: No pleural effusion. No pneumothorax. Osseous Structures: Unremarkable. Upper Abdomen: Unremarkable. Impression  1. Patchy bilateral pulmonary opacities, likely bilateral pneumonia. Correlation  for COVID 19 recommended. 2. Follow-up to resolution is recommended.       CT Results (most recent):  Results from [facility] encounter on 05/05/22    CT ABD PELV W CONT    Narrative  EXAMINATION: CT  ABDOMEN / PELVIS   5/5/2022 12:16 PM    ACCESSION NUMBER:  [id number]    INDICATION:  Left lower quadrant abdominal pain, concern for diverticulitis. COMPARISON: Chest x-ray 5/5/2022    TECHNIQUE: Contiguous axial computed tomographic images were obtained from the  domes of the diaphragm to the symphysis pubis after the administration of  intravenous contrast. Coronal reconstructions were also performed. Radiation dose reduction techniques were used for this study:  Our CT scanners  use one or all of the following: Automated exposure control, adjustment of the  mA and/or kVp according to patient's size, iterative reconstruction. FINDINGS:    LIMITED THORAX:The included portions of the heart and pericardium are  unremarkable. Patchy subpleural opacities of the lung bases. PERITONEUM/MESENTERY: No pneumoperitoneum. No lymphadenopathy. GI TRACT: There is diverticulosis, wall thickening, and surrounding inflammatory  stranding at the junction of the sigmoid colon and descending colon. There is no  adjacent well-formed fluid collection. There is a possible forming fistula with  the adjacent small bowel (axial images 66 and 67). The appendix is normal. The terminal ileum is unremarkable. There is multifocal  left lower quadrant small bowel wall thickening adjacent to the above-described  diverticulitis. The bowel loops are mildly dilated, without evidence of  obstruction. Small hiatal hernia. Mucosal hyperenhancement of the duodenum and proximal  jejunum. SPLEEN: Normal    ADRENALS: Normal    PANCREAS: Normal    LIVER: Normal    GALLBLADDER: Normal    KIDNEYS: Normal    BLADDER/: Unremarkable urinary bladder. The prostate gland and seminal  vesicles are unremarkable. VESSELS: The main portal vein and major tributary branches are patent. Mild  scattered atherosclerosis.     BONES/SOFT TISSUES: Pubic symphysis degenerative change. Lumbar spine  spondylosis without suspicious lytic or blastic bony lesions. Impression  1. Acute diverticulitis at the junction of the descending colon and sigmoid  colon. There is no evidence of large volume colonic perforation or well-formed  pericolonic abscess. 2.  Small bowel wall thickening in the pelvis adjacent to the diverticulitis is  most likely reactive. There is mild bowel dilation, without associated  obstruction. There may be a forming fistula between the small bowel and sigmoid  colon, best visualized on axial images 66 and 67.    3.  Mucosal hyperenhancement throughout the duodenum and proximal jejunum. This  is separate from the diverticulitis and may represent a mild enteritis. 4.  Partially visualized patchy opacities at the lung bases. As discussed on the  prior chest x-ray, this may be a pneumonia. 5.  Chronic findings otherwise as detailed above. ASSESSMENT/PLAN:  Problem List  Never Reviewed          Codes Class Noted    Diverticulitis ICD-10-CM: J27.00  ICD-9-CM: 562.11  5/5/2022        CAP (community acquired pneumonia) ICD-10-CM: J18.9  ICD-9-CM: 916  5/5/2022        * (Principal) Severe sepsis ([facility] [address] ICD-10-CM: A41.9, R65.20  ICD-9-CM: 038.9, 995.92  5/5/2022        Perforated bowel ([facility] [address] ICD-10-CM: K63.1  ICD-9-CM: 569.83  5/5/2022            Principal Problem:    Severe sepsis ([address] (5/5/2022)    Active Problems:    Diverticulitis (5/5/2022)      CAP (community acquired pneumonia) (5/5/2022)      Perforated bowel ([address] (5/5/2022)         The patient has sigmoid diverticulitis with likely microperforation, no evidence of any free air or abscess. No need for any surgical intervention at this time. He does has a phlegmon around sigmoid colon with thickened surrounding small bowel loops,   Recommend patient be admitted and started on IV antibiotics.      It was discussed with the patient that if his clinical status worsens there is the chance that he may need an emergent laparotomy with resection and diverting colostomy. But hope to avoid emergency surgery, he might need elective colectomy in future. The patient verbalized understanding. Patient to be admitted to hospitalist        Number and Complexity of Problems addressed and   Risks of complications and/or morbidity of management    Plan:  Care Management per Hospitalist  IV Abx - Zosyn & Vancomycin  IVF - D5% LR with 20 KCL @ 100mL hr  Pain/ nausea control  IS  I&O  Serial exams  No current plans for surgical intervention          Level of MDM (2/3 elements below)  Number and Complexity of Problems Addressed Amount and/or Complexity of Data to be Reviewed and Analyzed  *Each unique test, order, or document contributes to the combination of 2 or combination of 3 in Category 1 below.  Risk of Complications and/or Morbidity or Mortality of pt Management     94359  27980 SF Minimal  1self-limited or minor problem Minimal or none Minimal risk of morbidity from additional diagnostic testing or Rx   35799  14025 Low Low  2or more self-limited or minor problems;    or  1stable chronic illness;    or  [id number], uncomplicated illness or injury   Limited  (Must meet the requirements of at least 1 of the 2 categories)  Category 1: Tests and documents   Any combination of 2 from the following:  Review of prior external note(s) from each unique source*;  review of the result(s) of each unique test*;   ordering of each unique test*    or   Category 2: Assessment requiring an independent historian(s)  (For the categories of independent interpretation of tests and discussion of management or test interpretation, see moderate or high) Low risk of morbidity from additional diagnostic testing or treatment     95322  72721 Mod Moderate  1or more chronic illnesses with exacerbation, progression, or side effects of treatment;    or  2or more stable chronic illnesses;    or  1undiagnosed new problem with uncertain prognosis;    or  1acute illness with systemic symptoms;    or  [id number] complicated injury   Moderate  (Must meet the requirements of at least 1 out of 3 categories)  Category 1: Tests, documents, or independent historian(s)  Any combination of 3 from the following:   Review of prior external note(s) from each unique source*;  Review of the result(s) of each unique test*;  Ordering of each unique test*;  Assessment requiring an independent historian(s)    or  Category 2: Independent interpretation of tests   Independent interpretation of a test performed by another physician/other qualified health care professional (not separately reported);     or  Category 3: Discussion of management or test interpretation  Discussion of management or test interpretation with external physician/other qualified health care professional/appropriate source (not separately reported)   Moderate risk of morbidity from additional diagnostic testing or treatment  Examples only:  Prescription drug management   Decision regarding minor surgery with identified patient or procedure risk factors  Decision regarding elective major surgery without identified patient or procedure risk factors   Diagnosis or treatment significantly limited by social determinants of health       05433  15838 High High  1or more chronic illnesses with severe exacerbation, progression, or side effects of treatment;    or  1 acute or chronic illness or injury that poses a threat to life or bodily function   Extensive  (Must meet the requirements of at least 2 out of 3 categories)  Category 1: Tests, documents, or independent historian(s)  Any combination of 3 from the following:   Review of prior external note(s) from each unique source*;  Review of the result(s) of each unique test*;   Ordering of each unique test*;   Assessment requiring an independent historian(s)    or   Category 2: Independent interpretation of tests   Independent interpretation of a test performed by another physician/other qualified health care professional (not separately reported);     or  Category 3: Discussion of management or test interpretation  Discussion of management or test interpretation with external physician/other qualified health care professional/appropriate source (not separately reported)   High risk of morbidity from additional diagnostic testing or treatment  Examples only:  Drug therapy requiring intensive monitoring for toxicity  Decision regarding elective major surgery with identified patient or procedure risk factors  Decision regarding emergency major surgery  Decision regarding hospitalization  Decision not to resuscitate or to de-escalate care because of poor prognosis       Bower Pea, NP

## 2022-05-06 NOTE — PROGRESS NOTES
Micro unable to run sputum culture previously sent due to \"just spit. \" Pt without productive cough but will re-collect if possible.

## 2022-05-06 NOTE — PROGRESS NOTES
Pt alert and oriented times 3. Pt has D5-LR with KCl 20 infusing at 100ml/h. Pt has no complaints of pain or discomfort. Pt is on room air with even respirations. Will continue to monitor.

## 2022-05-06 NOTE — PROGRESS NOTES
VANCO DAILY FOLLOW UP NOTE  [facility] Pharmacokinetic Monitoring Service - Vancomycin    Consulting Provider: Dr. Miguel Angel Dior   Indication: sepsis, intra-abdominal infection, CAP  Target Concentration: Goal AUC/VINI 400-600 mg*hr/L  Day of Therapy: 2  Additional Antimicrobials: pip/tazo    Pertinent Laboratory Values: Wt Readings from Last 1 Encounters:   05/05/22 90.7 kg (200 lb)     Temp Readings from Last 1 Encounters:   05/06/22 99.3 °F (37.4 °C)     No components found for: PROCAL  Recent Labs     05/06/22  0358 05/05/22  0952   BUN 10 11   CREA 1.20 1.20   WBC 14.3* 16.8*   PCT  --  0.82*   LAC  --  0.9     Estimated Creatinine Clearance: 65.6 mL/min (based on SCr of 1.2 mg/dL). No results found for: Pinky Paget    MRSA Nasal Swab: N/A. Non-respiratory infection.     Assessment:  Date/Time Dose Concentration AUC         Note: Serum concentrations collected for AUC dosing may appear elevated if collected in close proximity to the dose administered, this is not necessarily an indication of toxicity    Plan:  Dosing recommendations based on Bayesian software  Continue vancomycin 2000 mg x 1 followed by 1500 mg q24h  Anticipated AUC of 471 and trough concentration of 13.4 at steady state  Renal labs as indicated   Vancomycin concentration ordered for 5/7 @ 0400  Pharmacy will continue to monitor patient and adjust therapy as indicated    Thank you for the consult,  Bryan Aguilar, NICHOLASD

## 2022-05-07 VITALS
WEIGHT: 221.5 LBS | HEART RATE: 70 BPM | OXYGEN SATURATION: 94 % | HEIGHT: 66 IN | BODY MASS INDEX: 35.6 KG/M2 | DIASTOLIC BLOOD PRESSURE: 91 MMHG | RESPIRATION RATE: 22 BRPM | SYSTOLIC BLOOD PRESSURE: 140 MMHG | TEMPERATURE: 100.8 F

## 2022-05-07 LAB
ANION GAP SERPL CALC-SCNC: 6 MMOL/L (ref 7–16)
BACTERIA SPEC CULT: NORMAL
BUN SERPL-MCNC: 9 MG/DL (ref 8–23)
CALCIUM SERPL-MCNC: 9.2 MG/DL (ref 8.3–10.4)
CHLORIDE SERPL-SCNC: 106 MMOL/L (ref 98–107)
CO2 SERPL-SCNC: 26 MMOL/L (ref 21–32)
CREAT SERPL-MCNC: 1.1 MG/DL (ref 0.8–1.5)
GLUCOSE SERPL-MCNC: 103 MG/DL (ref 65–100)
POTASSIUM SERPL-SCNC: 4.2 MMOL/L (ref 3.5–5.1)
SERVICE CMNT-IMP: NORMAL
SODIUM SERPL-SCNC: 138 MMOL/L (ref 136–145)
VANCOMYCIN SERPL-MCNC: 7.8 UG/ML

## 2022-05-07 PROCEDURE — 74011000258 HC RX REV CODE- 258: Performed by: FAMILY MEDICINE

## 2022-05-07 PROCEDURE — 80202 ASSAY OF VANCOMYCIN: CPT

## 2022-05-07 PROCEDURE — 74011250637 HC RX REV CODE- 250/637: Performed by: INTERNAL MEDICINE

## 2022-05-07 PROCEDURE — 74011000250 HC RX REV CODE- 250: Performed by: FAMILY MEDICINE

## 2022-05-07 PROCEDURE — 80048 BASIC METABOLIC PNL TOTAL CA: CPT

## 2022-05-07 PROCEDURE — 36415 COLL VENOUS BLD VENIPUNCTURE: CPT

## 2022-05-07 PROCEDURE — 74011000250 HC RX REV CODE- 250: Performed by: EMERGENCY MEDICINE

## 2022-05-07 PROCEDURE — 74011250636 HC RX REV CODE- 250/636: Performed by: FAMILY MEDICINE

## 2022-05-07 RX ORDER — LORAZEPAM 2 MG/ML
1 INJECTION INTRAMUSCULAR
Status: DISCONTINUED | OUTPATIENT
Start: 2022-05-07 | End: 2022-05-07 | Stop reason: HOSPADM

## 2022-05-07 RX ADMIN — SODIUM CHLORIDE, PRESERVATIVE FREE 10 ML: 5 INJECTION INTRAVENOUS at 06:24

## 2022-05-07 RX ADMIN — HYDRALAZINE HYDROCHLORIDE 25 MG: 25 TABLET, FILM COATED ORAL at 08:03

## 2022-05-07 RX ADMIN — PIPERACILLIN AND TAZOBACTAM 3.38 G: 3; .375 INJECTION, POWDER, LYOPHILIZED, FOR SOLUTION INTRAVENOUS at 04:12

## 2022-05-07 NOTE — PROGRESS NOTES
Reviewed notes for any new spiritual concerns              Patient lives in a [address] group home with 2 steps for entrance and 10 steps leading to bedrooms.

## 2022-05-07 NOTE — PROGRESS NOTES
H&P/Consult Note/Progress Note/Office Note:   Renu Simeon  MRN: [id number]  :1955  Age:66 y.o.    HPI: Renu Simeon is a 61 y.o. male who presented to the emergency department today with left lower quadrant pain. The patient states that about 2 days ago he began to develop some pain in his left lower quadrant. The pain continued to worsen and progressed over the last few days. He denies any associated nausea or emesis. He states that he is having regular bowel movements. He last had a bowel movement this morning, he states it was regular and soft. He denies any hematochezia. He states that he has never had diverticulitis in the past.    Upon arrival to the emergency department patient had a CT scan performed which revealed findings of acute diverticulitis with no evidence of perforation or abscess. There was some small bowel wall thickening adjacent to diverticulitis likely reactive, there is a possible fistula formed between the small bowel and sigmoid colon. He also had an elevated white blood cell count at 16.8 and a fever of 101.1. The patient has never had a colonoscopy in the past.  He has no significant past surgical history. He has no significant past medical history. The patient is a current smoker and smokes 1 pack/day. 22: Pt awake in bed. Reports pain has improved and \"is almost gone\". Reports +flatus/-BM. No nausea/ vomiting. Max 24hr temp 102. 6. NAD. WBC 14.3    22: Pt sitting in recliner. States feeling much better and wants to go home. Reports +flatus/-BM. Tolerating Clear Liquids. No nausea/ vomiting. Max 24hr temp 100.5. NAD. No past medical history on file. No past surgical history on file.   Current Facility-Administered Medications   Medication Dose Route Frequency    LORazepam (ATIVAN) injection 1 mg  1 mg IntraVENous Q4H PRN    hydrALAZINE (APRESOLINE) 20 mg/mL injection 10 mg  10 mg IntraVENous Q6H PRN    hydrALAZINE (APRESOLINE) tablet 25 mg  25 mg Oral TID    sodium chloride (NS) flush 5-10 mL  5-10 mL IntraVENous Q8H    sodium chloride (NS) flush 5-10 mL  5-10 mL IntraVENous PRN    sodium chloride (NS) flush 5-40 mL  5-40 mL IntraVENous Q8H    sodium chloride (NS) flush 5-40 mL  5-40 mL IntraVENous PRN    acetaminophen (TYLENOL) tablet 650 mg  650 mg Oral Q6H PRN    Or    acetaminophen (TYLENOL) suppository 650 mg  650 mg Rectal Q6H PRN    ondansetron (ZOFRAN ODT) tablet 4 mg  4 mg Oral Q8H PRN    Or    ondansetron (ZOFRAN) injection 4 mg  4 mg IntraVENous Q6H PRN    nicotine (NICODERM CQ) 21 mg/24 hr patch 1 Patch  1 Patch TransDERmal DAILY    HYDROmorphone (DILAUDID) injection 1 mg  1 mg IntraVENous Q4H PRN    sodium chloride (NS) flush 5-10 mL  5-10 mL IntraVENous PRN    piperacillin-tazobactam (ZOSYN) 3.375 g in 0.9% sodium chloride (MBP/ADV) 100 mL MBP  3.375 g IntraVENous Q8H    dextrose 5% - LR with KCl 20 mEq/L infusion   IntraVENous CONTINUOUS    albuterol-ipratropium (DUO-NEB) 2.5 MG-0.5 MG/3 ML  3 mL Nebulization Q6H PRN    vancomycin (VANCOCIN) 1500 mg in  ml infusion  1,500 mg IntraVENous Q24H     Patient has no known allergies. Social History     Socioeconomic History    Marital status: SINGLE     Social History     Tobacco Use   Smoking Status Not on file   Smokeless Tobacco Not on file     No family history on file. ROS: The patient has no difficulty with chest pain or shortness of breath. No fever or chills. Comprehensive review of systems was otherwise unremarkable except as noted above.     Physical Exam:   Visit Vitals  BP (!) 140/91   Pulse 70   Temp (!) 100.8 °F (38.2 °C)   Resp 22   Ht 5' 6\" (1.676 m)   Wt 221 lb 8 oz (100.5 kg)   SpO2 94%   BMI 35.75 kg/m²     Vitals:    05/07/22 0238 05/07/22 0419 05/07/22 0437 05/07/22 0738   BP: 130/79   (!) 140/91   Pulse: 71   70   Resp: 21   22   Temp: 100 °F (37.8 °C)   (!) 100.8 °F (38.2 °C)   SpO2: 96%   94%   Weight:  221 lb 11.2 oz (100.6 kg) 221 lb 8 oz (100.5 kg)    Height:         05/07 0701 - 05/07 1900  In: 240 [P.O.:240]  Out: -   05/05 1901 - 05/07 0700  In: -   Out: 650 [Urine:650]    Constitutional: Alert, oriented, cooperative patient in no acute distress; appears stated age    Eyes: Sclera are clear. EOMs intact  ENMT: no external lesions gross hearing normal; no obvious neck masses, no ear or lip lesions, nares normal  CV: RRR. Normal perfusion  Resp: No JVD. Breathing is  non-labored; no audible wheezing. GI: soft, obese, distended, non-tender. No rebound tenderness or guarding. No signs of peritonitis     Musculoskeletal: unremarkable with normal function. No embolic signs or cyanosis. Neuro:  Oriented; moves all 4; no focal deficits  Psychiatric: normal affect and mood, no memory impairment    Recent vitals (if inpt):  Patient Vitals for the past 24 hrs:   BP Temp Pulse Resp SpO2 Weight   05/07/22 0738 (!) 140/91 (!) 100.8 °F (38.2 °C) 70 22 94 %    05/07/22 0437      221 lb 8 oz (100.5 kg)   05/07/22 0419      221 lb 11.2 oz (100.6 kg)   05/07/22 0238 130/79 100 °F (37.8 °C) 71 21 96 %    05/07/22 0029   84      05/06/22 2319 (!) 145/94 100.3 °F (37.9 °C) 80 24 96 %    05/06/22 1955 (!) 166/90 99.8 °F (37.7 °C) 76 18 100 %    05/06/22 1512 (!) 164/110 (!) 100.5 °F (38.1 °C) 79 20 96 %    05/06/22 1340  100.4 °F (38 °C)       05/06/22 1110 (!) 158/89 (!) 100.5 °F (38.1 °C) 77 18 97 %        Amount and/or Complexity of Data Reviewed and Analyzed:  I reviewed and analyzed all of the unique labs and radiologic studies that are shown below as well as any that are in the HPI, and any that are in the expanded problem list below  *Each unique test, order, or document contributes to the combination of 2 or combination of 3 in Category 1 below. For this visit I also reviewed old records and prior notes.       Recent Labs     05/07/22  0327 05/06/22  0358 05/06/22  0358   WBC  --   --  14.3*   HGB  --   --  13.6   PLT  --   -- 186      < > 138   K 4.2   < > 3.8      < > 107   CO2 26   < > 25   BUN 9   < > 10   CREA 1.10   < > 1.20   *   < > 118*   TBILI  --   --  1.4*   ALT  --   --  30   AP  --   --  74    < > = values in this interval not displayed. Review of most recent CBC  Lab Results   Component Value Date/Time    WBC 14.3 (H) 05/06/2022 03:58 AM    HGB 13.6 05/06/2022 03:58 AM    HCT 40.5 (L) 05/06/2022 03:58 AM    PLATELET 159 90/94/9205 03:58 AM    MCV 88.4 05/06/2022 03:58 AM       Review of most recent BMP  Lab Results   Component Value Date/Time    Sodium 138 05/07/2022 03:27 AM    Potassium 4.2 05/07/2022 03:27 AM    Chloride 106 05/07/2022 03:27 AM    CO2 26 05/07/2022 03:27 AM    Anion gap 6 (L) 05/07/2022 03:27 AM    Glucose 103 (H) 05/07/2022 03:27 AM    BUN 9 05/07/2022 03:27 AM    Creatinine 1.10 05/07/2022 03:27 AM    GFR est AA >60 05/07/2022 03:27 AM    GFR est non-AA >60 05/07/2022 03:27 AM    Calcium 9.2 05/07/2022 03:27 AM       Review of most recent LFTs (and lipase if done)  Lab Results   Component Value Date/Time    ALT (SGPT) 30 05/06/2022 03:58 AM    AST (SGOT) 33 05/06/2022 03:58 AM    Alk.  phosphatase 74 05/06/2022 03:58 AM    Bilirubin, total 1.4 (H) 05/06/2022 03:58 AM     No results found for: LPSE    No results found for: INR, APTT, CBIL, LCAD, NH4, TROPT, TROIQ, INREXT, INREXT    Review of most recent HgbA1c  No results found for: HBA1C, SLE1WLXB, LUN2EQDL, HJR1HCBX    Nutritional assessment screen for wound healing issues:  Lab Results   Component Value Date/Time    Protein, total 7.2 05/06/2022 03:58 AM    Albumin 2.8 (L) 05/06/2022 03:58 AM       @lastcovr@  XR Results (most recent):  Results from Hospital Encounter encounter on 05/05/22    XR CHEST PORT    Narrative  EXAMINATION: CHEST RADIOGRAPH 5/5/2022 10:01 AM    ACCESSION NUMBER: [id number]    INDICATION: meets SIRS criteria    COMPARISON: None available    TECHNIQUE: A single view of the chest was obtained. FINDINGS:    Support Lines and Tubes: None    Cardiac Silhouette: Within normal limits in size. Lungs: Patchy bibasilar pulmonary opacities. Pleura: No pleural effusion. No pneumothorax. Osseous Structures: Unremarkable. Upper Abdomen: Unremarkable. Impression  1. Patchy bilateral pulmonary opacities, likely bilateral pneumonia. Correlation  for COVID 19 recommended. 2. Follow-up to resolution is recommended. CT Results (most recent):  Results from Hospital Encounter encounter on 05/05/22    CT ABD PELV W CONT    Narrative  EXAMINATION: CT  ABDOMEN / PELVIS   5/5/2022 12:16 PM    ACCESSION NUMBER:  [id number]    INDICATION:  Left lower quadrant abdominal pain, concern for diverticulitis. COMPARISON: Chest x-ray 5/5/2022    TECHNIQUE: Contiguous axial computed tomographic images were obtained from the  domes of the diaphragm to the symphysis pubis after the administration of  intravenous contrast. Coronal reconstructions were also performed. Radiation dose reduction techniques were used for this study:  Our CT scanners  use one or all of the following: Automated exposure control, adjustment of the  mA and/or kVp according to patient's size, iterative reconstruction. FINDINGS:    LIMITED THORAX:The included portions of the heart and pericardium are  unremarkable. Patchy subpleural opacities of the lung bases. PERITONEUM/MESENTERY: No pneumoperitoneum. No lymphadenopathy. GI TRACT: There is diverticulosis, wall thickening, and surrounding inflammatory  stranding at the junction of the sigmoid colon and descending colon. There is no  adjacent well-formed fluid collection. There is a possible forming fistula with  the adjacent small bowel (axial images 66 and 67). The appendix is normal. The terminal ileum is unremarkable. There is multifocal  left lower quadrant small bowel wall thickening adjacent to the above-described  diverticulitis.  The bowel loops are mildly dilated, without evidence of  obstruction. Small hiatal hernia. Mucosal hyperenhancement of the duodenum and proximal  jejunum. SPLEEN: Normal    ADRENALS: Normal    PANCREAS: Normal    LIVER: Normal    GALLBLADDER: Normal    KIDNEYS: Normal    BLADDER/: Unremarkable urinary bladder. The prostate gland and seminal  vesicles are unremarkable. VESSELS: The main portal vein and major tributary branches are patent. Mild  scattered atherosclerosis. BONES/SOFT TISSUES: Pubic symphysis degenerative change. Lumbar spine  spondylosis without suspicious lytic or blastic bony lesions. Impression  1. Acute diverticulitis at the junction of the descending colon and sigmoid  colon. There is no evidence of large volume colonic perforation or well-formed  pericolonic abscess. 2.  Small bowel wall thickening in the pelvis adjacent to the diverticulitis is  most likely reactive. There is mild bowel dilation, without associated  obstruction. There may be a forming fistula between the small bowel and sigmoid  colon, best visualized on axial images 66 and 67.    3.  Mucosal hyperenhancement throughout the duodenum and proximal jejunum. This  is separate from the diverticulitis and may represent a mild enteritis. 4.  Partially visualized patchy opacities at the lung bases. As discussed on the  prior chest x-ray, this may be a pneumonia. 5.  Chronic findings otherwise as detailed above.         ASSESSMENT/PLAN:  Problem List  Never Reviewed          Codes Class Noted    Diverticulitis ICD-10-CM: T51.59  ICD-9-CM: 562.11  5/5/2022        CAP (community acquired pneumonia) ICD-10-CM: J18.9  ICD-9-CM: 672  5/5/2022        * (Principal) Severe sepsis ([facility] [address] ICD-10-CM: A41.9, R65.20  ICD-9-CM: 038.9, 995.92  5/5/2022        Perforated bowel ([facility] [address] ICD-10-CM: K63.1  ICD-9-CM: 569.83  5/5/2022            Principal Problem:    Severe sepsis ([address] (5/5/2022)    Active Problems:    Diverticulitis (5/5/2022)      CAP (community acquired pneumonia) (5/5/2022)      Perforated bowel ([address] (5/5/2022)         The patient has sigmoid diverticulitis with likely microperforation, no evidence of any free air or abscess. No need for any surgical intervention at this time. He does has a phlegmon around sigmoid colon with thickened surrounding small bowel loops,   Recommend patient be admitted and started on IV antibiotics. It was discussed with the patient that if his clinical status worsens there is the chance that he may need an emergent laparotomy with resection and diverting colostomy. But hope to avoid emergency surgery, he might need elective colectomy in future. The patient verbalized understanding. Patient to be admitted to hospitalist        Number and Complexity of Problems addressed and   Risks of complications and/or morbidity of management    Plan:  Care Management per Hospitalist  IV Abx - Zosyn & Vancomycin  IVF - D5% LR with 20 KCL @ 100mL hr  Pain/ nausea control  IS  I&O  Continue conservative management. No current plans for surgical intervention          Level of MDM (2/3 elements below)  Number and Complexity of Problems Addressed Amount and/or Complexity of Data to be Reviewed and Analyzed  *Each unique test, order, or document contributes to the combination of 2 or combination of 3 in Category 1 below.  Risk of Complications and/or Morbidity or Mortality of pt Management     61968  77741 SF Minimal  1self-limited or minor problem Minimal or none Minimal risk of morbidity from additional diagnostic testing or Rx   81138  32069 Low Low  2or more self-limited or minor problems;    or  1stable chronic illness;    or  2HLWSF, uncomplicated illness or injury   Limited  (Must meet the requirements of at least 1 of the 2 categories)  Category 1: Tests and documents   Any combination of 2 from the following:  Review of prior external note(s) from each unique source*;  review of the result(s) of each unique test*;   ordering of each unique test*    or   Category 2: Assessment requiring an independent historian(s)  (For the categories of independent interpretation of tests and discussion of management or test interpretation, see moderate or high) Low risk of morbidity from additional diagnostic testing or treatment     69630  35504 Mod Moderate  1or more chronic illnesses with exacerbation, progression, or side effects of treatment;    or  2or more stable chronic illnesses;    or  1undiagnosed new problem with uncertain prognosis;    or  1acute illness with systemic symptoms;    or  [id number] complicated injury   Moderate  (Must meet the requirements of at least 1 out of 3 categories)  Category 1: Tests, documents, or independent historian(s)  Any combination of 3 from the following:   Review of prior external note(s) from each unique source*;  Review of the result(s) of each unique test*;  Ordering of each unique test*;  Assessment requiring an independent historian(s)    or  Category 2: Independent interpretation of tests   Independent interpretation of a test performed by another physician/other qualified health care professional (not separately reported);     or  Category 3: Discussion of management or test interpretation  Discussion of management or test interpretation with external physician/other qualified health care professional/appropriate source (not separately reported)   Moderate risk of morbidity from additional diagnostic testing or treatment  Examples only:  Prescription drug management   Decision regarding minor surgery with identified patient or procedure risk factors  Decision regarding elective major surgery without identified patient or procedure risk factors   Diagnosis or treatment significantly limited by social determinants of health       68677  73591 High High  1or more chronic illnesses with severe exacerbation, progression, or side effects of treatment;    or  1 acute or chronic illness or injury that poses a threat to life or bodily function   Extensive  (Must meet the requirements of at least 2 out of 3 categories)  Category 1: Tests, documents, or independent historian(s)  Any combination of 3 from the following:   Review of prior external note(s) from each unique source*;  Review of the result(s) of each unique test*;   Ordering of each unique test*;   Assessment requiring an independent historian(s)    or   Category 2: Independent interpretation of tests   Independent interpretation of a test performed by another physician/other qualified health care professional (not separately reported);     or  Category 3: Discussion of management or test interpretation  Discussion of management or test interpretation with external physician/other qualified health care professional/appropriate source (not separately reported)   High risk of morbidity from additional diagnostic testing or treatment  Examples only:  Drug therapy requiring intensive monitoring for toxicity  Decision regarding elective major surgery with identified patient or procedure risk factors  Decision regarding emergency major surgery  Decision regarding hospitalization  Decision not to resuscitate or to de-escalate care because of poor prognosis       Myra Kennedy, NP

## 2022-05-07 NOTE — PROGRESS NOTES
Pt resting quietly in bed. Respirations are even and unlabored on RA. No signs of distress noted or needs stated at this time. Call light in reach. Will continue to monitor. SBAR to be given to oncoming nurse.

## 2022-05-07 NOTE — PROGRESS NOTES
Pt leaving AMA. RN and MD counseled pt in length regarding condition, infection, need for antibiotics, risk of leaving without treatment, etc. Pt states \"I know I just need to go\" but would not elaborate further. AMA paperwork signed by pt, nurse, and MD and placed in paper chart. PIV removed.

## 2022-05-10 LAB
BACTERIA SPEC CULT: NORMAL
BACTERIA SPEC CULT: NORMAL
SERVICE CMNT-IMP: NORMAL
SERVICE CMNT-IMP: NORMAL

## 2022-05-10 NOTE — DISCHARGE SUMMARY
Hospitalist Discharge Summary   Admit Date:  2022 10:39 AM   DC Note date: 5/10/2022  Name:  Trey Colon   Age:  61 y.o. Sex:  male  :  1955   MRN:  [id number]   Room:  [facility]  PCP:  None    Presenting Complaint: Abdominal Pain    Initial Admission Diagnosis: Severe sepsis ([facility] [address] [A41.9, R65.20]  Perforated bowel ([facility] [address] [K63.1]  Diverticulitis [K57.92]  CAP (community acquired pneumonia) [J18.9]     Problem List for this Hospitalization:  Hospital Problems as of 2022 Never Reviewed          Codes Class Noted - Resolved POA    Diverticulitis ICD-10-CM: K57.92  ICD-9-CM: 562.11  2022 - Present Unknown        CAP (community acquired pneumonia) ICD-10-CM: J18.9  ICD-9-CM: 486  2022 - Present Unknown        * (Principal) Severe sepsis ([facility] [address] ICD-10-CM: A41.9, R65.20  ICD-9-CM: 038.9, 995.92  2022 - Present Unknown        Perforated bowel ([facility] [address] ICD-10-CM: K63.1  ICD-9-CM: 569.83  2022 - Present Unknown            Did Patient have Sepsis (YES OR NO): NO    Hospital Course:  Patient is a 61 y.o. male who presented to the ED for cc LLQ pain for the past two days. No noted trauma. Nothing seems to make it better or worse. Hx of tobacco abuse. Denies SOB     Family history reviewed and negative except as noted above.     Vitals- Temp 101. 1. RR 27     Labs- WBC 16.8, total bili 1.3, procal 0.82. Rapid COVID negative.      Chest x ray with patch bilateral opacities     CT abdomen pelvis -  Acute diverticulitis at the junction of the descending colon and sigmoid colon\"     2022  Pt seen and evaluated  Febrile overnight  Abdominal pain is improved  Seen by surgery no plans for surgical intervention at this  Denies nausea vomiting or chills. Still having some loose stools      2022  Pt seen. Still with fevers. Wants to leave  States he did not hve any problems before coming here. Pt advised to stay or leave AMA. He decided AMA.   Told to seek medical care elsewhere immediately or return here. Disposition: Left Against Medical Advice  Diet: No diet orders on file  Code Status: Prior    Follow Up Orders:  No orders of the defined types were placed in this encounter. Follow-up Information    None         Follow up labs/diagnostics (ultimately defer to outpatient provider):      Time spent in patient discharge and coordination 20 minutes. Plan was discussed with the patient and idt. All questions answered. Patient was stable at time of discharge. Instructions given to call a physician or return if any concerns. Discharge Info: There are no discharge medications for this patient. Procedures done this admission:  * No surgery found *    Consults this admission:  None    Echocardiogram/EKG results:  No results found for this or any previous visit. EKG Results     Procedure 720 Value Units Date/Time    EKG, 12 LEAD, INITIAL [[id number]]     Order Status: Canceled     EKG, 12 LEAD, INITIAL [[id number]] Collected: 05/05/22 0939    Order Status: Completed Updated: 05/05/22 1321     Ventricular Rate 90 BPM      Atrial Rate 90 BPM      P-R Interval 194 ms      QRS Duration 136 ms      Q-T Interval 378 ms      QTC Calculation (Bezet) 462 ms      Calculated P Axis 56 degrees      Calculated R Axis -75 degrees      Calculated T Axis 50 degrees      Diagnosis --     Normal sinus rhythm  Right bundle branch block  Left anterior fascicular block  !!! Bifascicular block !!!  Minimal voltage criteria for LVH, may be normal variant ( R in aVL )  Septal infarct , age undetermined  Abnormal ECG  No previous ECGs available  Confirmed by Solitario Padilla MD (), Savi Levi ([id number]) on 5/5/2022 1:20:58 PM            Diagnostic Imaging/Tests:   CT ABD PELV W CONT    Result Date: 5/5/2022  EXAMINATION: CT  ABDOMEN / PELVIS   5/5/2022 12:16 PM ACCESSION NUMBER:  [id number] INDICATION:  Left lower quadrant abdominal pain, concern for diverticulitis.  COMPARISON: Chest x-ray 5/5/2022 TECHNIQUE: Contiguous axial computed tomographic images were obtained from the domes of the diaphragm to the symphysis pubis after the administration of intravenous contrast. Coronal reconstructions were also performed. Radiation dose reduction techniques were used for this study:  Our CT scanners use one or all of the following: Automated exposure control, adjustment of the mA and/or kVp according to patient's size, iterative reconstruction. FINDINGS: LIMITED THORAX:The included portions of the heart and pericardium are unremarkable. Patchy subpleural opacities of the lung bases. PERITONEUM/MESENTERY: No pneumoperitoneum. No lymphadenopathy. GI TRACT: There is diverticulosis, wall thickening, and surrounding inflammatory stranding at the junction of the sigmoid colon and descending colon. There is no adjacent well-formed fluid collection. There is a possible forming fistula with the adjacent small bowel (axial images 66 and 67). The appendix is normal. The terminal ileum is unremarkable. There is multifocal left lower quadrant small bowel wall thickening adjacent to the above-described diverticulitis. The bowel loops are mildly dilated, without evidence of obstruction. Small hiatal hernia. Mucosal hyperenhancement of the duodenum and proximal jejunum. SPLEEN: Normal ADRENALS: Normal PANCREAS: Normal LIVER: Normal GALLBLADDER: Normal KIDNEYS: Normal BLADDER/: Unremarkable urinary bladder. The prostate gland and seminal vesicles are unremarkable. VESSELS: The main portal vein and major tributary branches are patent. Mild scattered atherosclerosis. BONES/SOFT TISSUES: Pubic symphysis degenerative change. Lumbar spine spondylosis without suspicious lytic or blastic bony lesions. 1.  Acute diverticulitis at the junction of the descending colon and sigmoid colon. There is no evidence of large volume colonic perforation or well-formed pericolonic abscess.  2.  Small bowel wall thickening in the pelvis adjacent to the diverticulitis is most likely reactive. There is mild bowel dilation, without associated obstruction. There may be a forming fistula between the small bowel and sigmoid colon, best visualized on axial images 66 and 67. 3.  Mucosal hyperenhancement throughout the duodenum and proximal jejunum. This is separate from the diverticulitis and may represent a mild enteritis. 4.  Partially visualized patchy opacities at the lung bases. As discussed on the prior chest x-ray, this may be a pneumonia. 5.  Chronic findings otherwise as detailed above. XR CHEST PORT    Result Date: 5/5/2022  EXAMINATION: CHEST RADIOGRAPH 5/5/2022 10:01 AM ACCESSION NUMBER: [id number] INDICATION: meets SIRS criteria COMPARISON: None available TECHNIQUE: A single view of the chest was obtained. FINDINGS: Support Lines and Tubes: None Cardiac Silhouette: Within normal limits in size. Lungs: Patchy bibasilar pulmonary opacities. Pleura: No pleural effusion. No pneumothorax. Osseous Structures: Unremarkable. Upper Abdomen: Unremarkable. 1. Patchy bilateral pulmonary opacities, likely bilateral pneumonia. Correlation for COVID 19 recommended. 2. Follow-up to resolution is recommended.        All Micro Results     Procedure Component Value Units Date/Time    BLOOD CULTURE [[id number]] Collected: 05/05/22 0952    Order Status: Completed Specimen: Blood Updated: 05/10/22 0739     Special Requests: --        LEFT  Antecubital       Culture result: NO GROWTH 5 DAYS       BLOOD CULTURE [[id number]] Collected: 05/05/22 1709    Order Status: Completed Specimen: Blood Updated: 05/10/22 0739     Special Requests: --        RIGHT  Antecubital       Culture result: NO GROWTH 5 DAYS       CULTURE, URINE [[id number]] Collected: 05/05/22 1225    Order Status: Completed Specimen: Urine from Clean catch Updated: 05/07/22 0916     Special Requests: NO SPECIAL REQUESTS        Culture result:       10,000 to 50,000 COLONIES/mL MIXED SKIN CLARK ISOLATED          CULTURE, RESPIRATORY/SPUTUM/BRONCH Vega Grist STAIN [[id number]] Collected: 05/05/22 0025    Order Status: Completed Specimen: Sputum Updated: 05/06/22 1028     Special Requests: NO SPECIAL REQUESTS        GRAM STAIN       GRAM STAIN EXAMINATION OF THIS SPECIMEN INDICATED OROPHARYNGEAL CONTAMINATION. PLEASE SUBMIT A NEW SPECIMEN OR NOTIFY THE MICRO DEPT WITHIN 48HRS IF FURTHER WORKUP IS DESIRED. Culture result:       Please reorder and recollect. RESULTS VERIFIED, PHONED TO AND READ BACK BY  CYN NEAL @[facility] 5.6.22 [place]      COVID-19 RAPID TEST [[id number]] Collected: 05/05/22 1112    Order Status: Completed Specimen: Nasopharyngeal Updated: 05/05/22 1135     Specimen source NASAL        COVID-19 rapid test Not detected        Comment:      The specimen is NEGATIVE for SARS-CoV-2, the novel coronavirus associated with COVID-19. A negative result does not rule out COVID-19. This test has been authorized by the FDA under an Emergency Use Authorization (EUA) for use by authorized laboratories. Fact sheet for Healthcare Providers: [employer]  Fact sheet for Patients: [employer]       Methodology: Isothermal Nucleic Acid Amplification               Labs: Results:       BMP, Mg, Phos No results for input(s): NA, K, CL, CO2, AGAP, BUN, CREA, CA, GLU, MG, PHOS in the last 72 hours. CBC No results for input(s): WBC, RBC, HGB, HCT, PLT, GRANS, LYMPH, EOS, MONOS, BASOS, IG, ANEU, ABL, SOLEDAD, ABM, ABB, AIG, HGBEXT, HCTEXT, PLTEXT in the last 72 hours. LFT No results for input(s): ALT, TBIL, AP, TP, ALB, GLOB, AGRAT in the last 72 hours.     No lab exists for component: SGOT, GPT   Cardiac Testing No results found for: BNPP, BNP, CPK, RCK1, RCK2, RCK3, RCK4, CKMB, CKNDX, CKND1, TROPT, TROIQ   Coagulation Tests No results found for: PTP, INR, APTT, INREXT   A1c No results found for: HBA1C, UAX4ECOJ   Lipid Panel No results found for: CHOL, CHOLPOCT, CHOLX, CHLST, CHOLV, 474452, HDL, HDLP, LDL, LDLC, DLDLP, 403675, VLDLC, VLDL, TGLX, TRIGL, TRIGP, TGLPOCT, CHHD, CHHDX   Thyroid Panel No results found for: TSH, T4, FT4, TT3, T3U, TSHEXT     Most Recent UA Lab Results   Component Value Date/Time    Color YELLOW 05/05/2022 12:25 PM    Appearance CLOUDY 05/05/2022 12:25 PM    pH (UA) 6.0 05/05/2022 12:25 PM    Protein 30 (A) 05/05/2022 12:25 PM    Glucose Negative 05/05/2022 12:25 PM    Ketone Negative 05/05/2022 12:25 PM    Bilirubin SMALL (A) 05/05/2022 12:25 PM    Blood SMALL (A) 05/05/2022 12:25 PM    Urobilinogen 4.0 (H) 05/05/2022 12:25 PM    Nitrites Negative 05/05/2022 12:25 PM    Leukocyte Esterase Negative 05/05/2022 12:25 PM    WBC 3-5 05/05/2022 12:25 PM    RBC 0-3 05/05/2022 12:25 PM    Epithelial cells 0 05/05/2022 12:25 PM    Bacteria 1+ (H) 05/05/2022 12:25 PM    Casts 0 05/05/2022 12:25 PM    Crystals, urine 0 05/05/2022 12:25 PM    Mucus 0 05/05/2022 12:25 PM    Other observations RESULTS VERIFIED MANUALLY 05/05/2022 12:25 PM          All Labs from Last 24 Hrs:  No results found for this or any previous visit (from the past 24 hour(s)). Current Med List in Hospital:   No current facility-administered medications for this encounter. No current outpatient medications on file. No Known Allergies    There is no immunization history on file for this patient. Recent Vital Data:  No data found. Oxygen Therapy  O2 Sat (%): 94 % (05/07/22 0738)  Pulse via Oximetry: 77 beats per minute (05/05/22 1600)  O2 Device: None (Room air) (05/06/22 2016)    Estimated body mass index is 35.75 kg/m² as calculated from the following:    Height as of this encounter: 5' 6\" (1.676 m). Weight as of this encounter: 100.5 kg (221 lb 8 oz). No intake or output data in the 24 hours ending 05/10/22 1614      Physical Exam:    General:    Well nourished.   No overt distress  Head:  Normocephalic, atraumatic  Eyes:  Sclerae appear normal.  Pupils equally round. HENT:  Nares appear normal, no drainage. Moist mucous membranes  Neck:  No restricted ROM. Trachea midline  CV:   RRR. No m/r/g. No JVD  Lungs:   CTAB. No wheezing, rhonchi, or rales. Even, unlabored  Abdomen:   Soft, nontender, nondistended. Extremities: Warm and dry. No cyanosis or clubbing. No edema. Skin:     No rashes. Normal coloration  Neuro:  CN II-XII grossly intact. Psych:  Normal mood and affect. Signed:  Lisa Monaco MD    Part of this note may have been written by using a voice dictation software. The note has been proof read but may still contain some grammatical/other typographical errors.